# Patient Record
Sex: FEMALE | Race: WHITE | NOT HISPANIC OR LATINO | Employment: OTHER | URBAN - METROPOLITAN AREA
[De-identification: names, ages, dates, MRNs, and addresses within clinical notes are randomized per-mention and may not be internally consistent; named-entity substitution may affect disease eponyms.]

---

## 2018-02-20 ENCOUNTER — APPOINTMENT (OUTPATIENT)
Dept: GENERAL RADIOLOGY | Facility: HOSPITAL | Age: 83
End: 2018-02-20

## 2018-02-20 ENCOUNTER — HOSPITAL ENCOUNTER (INPATIENT)
Facility: HOSPITAL | Age: 83
LOS: 7 days | Discharge: NURSING, REHAB OR LONG-TERM CARE | End: 2018-02-27
Attending: EMERGENCY MEDICINE | Admitting: HOSPITALIST

## 2018-02-20 DIAGNOSIS — R26.2 DIFFICULTY WALKING: ICD-10-CM

## 2018-02-20 DIAGNOSIS — S72.001A CLOSED FRACTURE OF NECK OF RIGHT FEMUR, INITIAL ENCOUNTER (HCC): Primary | ICD-10-CM
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
## 2018-02-20 DIAGNOSIS — I48.20 CHRONIC ATRIAL FIBRILLATION (HCC): ICD-10-CM

## 2018-02-20 LAB
ALBUMIN SERPL-MCNC: 4 G/DL (ref 3.5–5.2)
ALBUMIN/GLOB SERPL: 1.5 G/DL
ALP SERPL-CCNC: 75 U/L (ref 39–117)
ALT SERPL W P-5'-P-CCNC: 15 U/L (ref 1–33)
ANION GAP SERPL CALCULATED.3IONS-SCNC: 14 MMOL/L
AST SERPL-CCNC: 20 U/L (ref 1–32)
BASOPHILS # BLD AUTO: 0.02 10*3/MM3 (ref 0–0.2)
BASOPHILS NFR BLD AUTO: 0.2 % (ref 0–1.5)
BILIRUB SERPL-MCNC: 1.1 MG/DL (ref 0.1–1.2)
BUN BLD-MCNC: 16 MG/DL (ref 8–23)
BUN/CREAT SERPL: 18.2 (ref 7–25)
CALCIUM SPEC-SCNC: 9.5 MG/DL (ref 8.6–10.5)
CHLORIDE SERPL-SCNC: 101 MMOL/L (ref 98–107)
CO2 SERPL-SCNC: 26 MMOL/L (ref 22–29)
CREAT BLD-MCNC: 0.88 MG/DL (ref 0.57–1)
DEPRECATED RDW RBC AUTO: 48.8 FL (ref 37–54)
DIGOXIN SERPL-MCNC: 1.1 NG/ML (ref 0.6–1.2)
EOSINOPHIL # BLD AUTO: 0.04 10*3/MM3 (ref 0–0.7)
EOSINOPHIL NFR BLD AUTO: 0.5 % (ref 0.3–6.2)
ERYTHROCYTE [DISTWIDTH] IN BLOOD BY AUTOMATED COUNT: 14.7 % (ref 11.7–13)
GFR SERPL CREATININE-BSD FRML MDRD: 61 ML/MIN/1.73
GLOBULIN UR ELPH-MCNC: 2.7 GM/DL
GLUCOSE BLD-MCNC: 115 MG/DL (ref 65–99)
HCT VFR BLD AUTO: 42.4 % (ref 35.6–45.5)
HGB BLD-MCNC: 13.6 G/DL (ref 11.9–15.5)
IMM GRANULOCYTES # BLD: 0.02 10*3/MM3 (ref 0–0.03)
IMM GRANULOCYTES NFR BLD: 0.2 % (ref 0–0.5)
LYMPHOCYTES # BLD AUTO: 0.72 10*3/MM3 (ref 0.9–4.8)
LYMPHOCYTES NFR BLD AUTO: 8.6 % (ref 19.6–45.3)
MCH RBC QN AUTO: 29.1 PG (ref 26.9–32)
MCHC RBC AUTO-ENTMCNC: 32.1 G/DL (ref 32.4–36.3)
MCV RBC AUTO: 90.6 FL (ref 80.5–98.2)
MONOCYTES # BLD AUTO: 0.6 10*3/MM3 (ref 0.2–1.2)
MONOCYTES NFR BLD AUTO: 7.2 % (ref 5–12)
NEUTROPHILS # BLD AUTO: 6.95 10*3/MM3 (ref 1.9–8.1)
NEUTROPHILS NFR BLD AUTO: 83.3 % (ref 42.7–76)
PLATELET # BLD AUTO: 201 10*3/MM3 (ref 140–500)
PMV BLD AUTO: 9.4 FL (ref 6–12)
POTASSIUM BLD-SCNC: 4.1 MMOL/L (ref 3.5–5.2)
PROT SERPL-MCNC: 6.7 G/DL (ref 6–8.5)
RBC # BLD AUTO: 4.68 10*6/MM3 (ref 3.9–5.2)
SODIUM BLD-SCNC: 141 MMOL/L (ref 136–145)
WBC NRBC COR # BLD: 8.35 10*3/MM3 (ref 4.5–10.7)

## 2018-02-20 PROCEDURE — 71045 X-RAY EXAM CHEST 1 VIEW: CPT

## 2018-02-20 PROCEDURE — 93010 ELECTROCARDIOGRAM REPORT: CPT | Performed by: INTERNAL MEDICINE

## 2018-02-20 PROCEDURE — 80053 COMPREHEN METABOLIC PANEL: CPT | Performed by: EMERGENCY MEDICINE

## 2018-02-20 PROCEDURE — 99284 EMERGENCY DEPT VISIT MOD MDM: CPT

## 2018-02-20 PROCEDURE — 25810000003 SODIUM CHLORIDE 0.9 % WITH KCL 20 MEQ 20-0.9 MEQ/L-% SOLUTION: Performed by: HOSPITALIST

## 2018-02-20 PROCEDURE — 25010000002 MORPHINE PER 10 MG: Performed by: EMERGENCY MEDICINE

## 2018-02-20 PROCEDURE — 80162 ASSAY OF DIGOXIN TOTAL: CPT | Performed by: EMERGENCY MEDICINE

## 2018-02-20 PROCEDURE — 25010000002 HYDROMORPHONE PER 4 MG: Performed by: EMERGENCY MEDICINE

## 2018-02-20 PROCEDURE — 25010000002 HYDROMORPHONE PER 4 MG: Performed by: HOSPITALIST

## 2018-02-20 PROCEDURE — 93005 ELECTROCARDIOGRAM TRACING: CPT | Performed by: EMERGENCY MEDICINE

## 2018-02-20 PROCEDURE — 25010000002 ONDANSETRON PER 1 MG: Performed by: EMERGENCY MEDICINE

## 2018-02-20 PROCEDURE — 73502 X-RAY EXAM HIP UNI 2-3 VIEWS: CPT

## 2018-02-20 PROCEDURE — 85025 COMPLETE CBC W/AUTO DIFF WBC: CPT | Performed by: EMERGENCY MEDICINE

## 2018-02-20 RX ORDER — HYDROMORPHONE HCL 110MG/55ML
0.5 PATIENT CONTROLLED ANALGESIA SYRINGE INTRAVENOUS ONCE
Status: COMPLETED | OUTPATIENT
Start: 2018-02-20 | End: 2018-02-20

## 2018-02-20 RX ORDER — MORPHINE SULFATE 2 MG/ML
2 INJECTION, SOLUTION INTRAMUSCULAR; INTRAVENOUS ONCE
Status: DISCONTINUED | OUTPATIENT
Start: 2018-02-20 | End: 2018-02-20

## 2018-02-20 RX ORDER — ATORVASTATIN CALCIUM 40 MG/1
40 TABLET, FILM COATED ORAL DAILY
COMMUNITY
End: 2018-02-27 | Stop reason: HOSPADM

## 2018-02-20 RX ORDER — ATORVASTATIN CALCIUM 10 MG/1
10 TABLET, FILM COATED ORAL DAILY
Status: DISCONTINUED | OUTPATIENT
Start: 2018-02-20 | End: 2018-02-27 | Stop reason: HOSPADM

## 2018-02-20 RX ORDER — HYDROMORPHONE HYDROCHLORIDE 1 MG/ML
0.5 INJECTION, SOLUTION INTRAMUSCULAR; INTRAVENOUS; SUBCUTANEOUS EVERY 4 HOURS PRN
Status: DISCONTINUED | OUTPATIENT
Start: 2018-02-20 | End: 2018-02-23

## 2018-02-20 RX ORDER — ONDANSETRON 2 MG/ML
4 INJECTION INTRAMUSCULAR; INTRAVENOUS ONCE
Status: COMPLETED | OUTPATIENT
Start: 2018-02-20 | End: 2018-02-20

## 2018-02-20 RX ORDER — SODIUM CHLORIDE AND POTASSIUM CHLORIDE 150; 900 MG/100ML; MG/100ML
75 INJECTION, SOLUTION INTRAVENOUS CONTINUOUS
Status: DISCONTINUED | OUTPATIENT
Start: 2018-02-20 | End: 2018-02-21

## 2018-02-20 RX ORDER — SODIUM CHLORIDE 0.9 % (FLUSH) 0.9 %
10 SYRINGE (ML) INJECTION AS NEEDED
Status: DISCONTINUED | OUTPATIENT
Start: 2018-02-20 | End: 2018-02-27 | Stop reason: HOSPADM

## 2018-02-20 RX ORDER — PANTOPRAZOLE SODIUM 40 MG/1
40 TABLET, DELAYED RELEASE ORAL
Status: DISCONTINUED | OUTPATIENT
Start: 2018-02-21 | End: 2018-02-27 | Stop reason: HOSPADM

## 2018-02-20 RX ORDER — MORPHINE SULFATE 2 MG/ML
2 INJECTION, SOLUTION INTRAMUSCULAR; INTRAVENOUS ONCE
Status: COMPLETED | OUTPATIENT
Start: 2018-02-20 | End: 2018-02-20

## 2018-02-20 RX ORDER — ATORVASTATIN CALCIUM 20 MG/1
40 TABLET, FILM COATED ORAL DAILY
Status: DISCONTINUED | OUTPATIENT
Start: 2018-02-20 | End: 2018-02-20

## 2018-02-20 RX ORDER — DIGOXIN 125 MCG
125 TABLET ORAL
Status: DISCONTINUED | OUTPATIENT
Start: 2018-02-21 | End: 2018-02-27 | Stop reason: HOSPADM

## 2018-02-20 RX ORDER — DIGOXIN 125 MCG
125 TABLET ORAL
COMMUNITY
End: 2018-04-10 | Stop reason: HOSPADM

## 2018-02-20 RX ORDER — METOPROLOL SUCCINATE 25 MG/1
12.5 TABLET, EXTENDED RELEASE ORAL DAILY
COMMUNITY
End: 2018-02-27 | Stop reason: HOSPADM

## 2018-02-20 RX ORDER — ISOSORBIDE DINITRATE 30 MG/1
30 TABLET ORAL 4 TIMES DAILY
COMMUNITY
End: 2018-02-20

## 2018-02-20 RX ORDER — METOPROLOL SUCCINATE 25 MG/1
12.5 TABLET, EXTENDED RELEASE ORAL DAILY
Status: DISCONTINUED | OUTPATIENT
Start: 2018-02-20 | End: 2018-02-22

## 2018-02-20 RX ORDER — ASPIRIN 81 MG/1
81 TABLET, CHEWABLE ORAL DAILY
Status: DISCONTINUED | OUTPATIENT
Start: 2018-02-20 | End: 2018-02-21

## 2018-02-20 RX ORDER — ONDANSETRON 2 MG/ML
4 INJECTION INTRAMUSCULAR; INTRAVENOUS EVERY 6 HOURS PRN
Status: DISCONTINUED | OUTPATIENT
Start: 2018-02-20 | End: 2018-02-22 | Stop reason: SDUPTHER

## 2018-02-20 RX ORDER — ISOSORBIDE MONONITRATE 30 MG/1
30 TABLET, EXTENDED RELEASE ORAL DAILY
COMMUNITY
End: 2019-03-04 | Stop reason: HOSPADM

## 2018-02-20 RX ORDER — ISOSORBIDE MONONITRATE 30 MG/1
30 TABLET, EXTENDED RELEASE ORAL DAILY
Status: DISCONTINUED | OUTPATIENT
Start: 2018-02-20 | End: 2018-02-27 | Stop reason: HOSPADM

## 2018-02-20 RX ADMIN — ISOSORBIDE MONONITRATE 30 MG: 30 TABLET ORAL at 20:25

## 2018-02-20 RX ADMIN — ONDANSETRON 4 MG: 2 INJECTION INTRAMUSCULAR; INTRAVENOUS at 14:03

## 2018-02-20 RX ADMIN — MORPHINE SULFATE 2 MG: 2 INJECTION, SOLUTION INTRAMUSCULAR; INTRAVENOUS at 14:05

## 2018-02-20 RX ADMIN — METOPROLOL SUCCINATE 12.5 MG: 25 TABLET, FILM COATED, EXTENDED RELEASE ORAL at 20:25

## 2018-02-20 RX ADMIN — HYDROMORPHONE HYDROCHLORIDE 0.5 MG: 10 INJECTION INTRAMUSCULAR; INTRAVENOUS; SUBCUTANEOUS at 20:04

## 2018-02-20 RX ADMIN — POTASSIUM CHLORIDE AND SODIUM CHLORIDE 75 ML/HR: 900; 150 INJECTION, SOLUTION INTRAVENOUS at 20:02

## 2018-02-20 RX ADMIN — HYDROMORPHONE HYDROCHLORIDE 0.5 MG: 2 INJECTION INTRAMUSCULAR; INTRAVENOUS; SUBCUTANEOUS at 15:12

## 2018-02-20 RX ADMIN — ATORVASTATIN CALCIUM 10 MG: 10 TABLET, FILM COATED ORAL at 20:25

## 2018-02-20 RX ADMIN — MORPHINE SULFATE 2 MG: 2 INJECTION, SOLUTION INTRAMUSCULAR; INTRAVENOUS at 14:41

## 2018-02-20 RX ADMIN — Medication 10 ML: at 14:41

## 2018-02-21 ENCOUNTER — ANESTHESIA (OUTPATIENT)
Dept: PERIOP | Facility: HOSPITAL | Age: 83
End: 2018-02-21

## 2018-02-21 ENCOUNTER — APPOINTMENT (OUTPATIENT)
Dept: CARDIOLOGY | Facility: HOSPITAL | Age: 83
End: 2018-02-21

## 2018-02-21 ENCOUNTER — ANESTHESIA EVENT (OUTPATIENT)
Dept: PERIOP | Facility: HOSPITAL | Age: 83
End: 2018-02-21

## 2018-02-21 ENCOUNTER — APPOINTMENT (OUTPATIENT)
Dept: GENERAL RADIOLOGY | Facility: HOSPITAL | Age: 83
End: 2018-02-21

## 2018-02-21 LAB
ALBUMIN SERPL-MCNC: 3.7 G/DL (ref 3.5–5.2)
ALBUMIN/GLOB SERPL: 1.2 G/DL
ALP SERPL-CCNC: 64 U/L (ref 39–117)
ALT SERPL W P-5'-P-CCNC: 10 U/L (ref 1–33)
ANION GAP SERPL CALCULATED.3IONS-SCNC: 12.3 MMOL/L
AORTIC ARCH: 1.9 CM
AORTIC DIMENSIONLESS INDEX: 0.4 (DI)
ASCENDING AORTA: 2.6 CM
AST SERPL-CCNC: 18 U/L (ref 1–32)
BASOPHILS # BLD AUTO: 0.02 10*3/MM3 (ref 0–0.2)
BASOPHILS NFR BLD AUTO: 0.3 % (ref 0–1.5)
BH CV ECHO MEAS - ACS: 1.4 CM
BH CV ECHO MEAS - AI DEC SLOPE: 450.5 CM/SEC^2
BH CV ECHO MEAS - AI MAX PG: 71.5 MMHG
BH CV ECHO MEAS - AI MAX VEL: 422 CM/SEC
BH CV ECHO MEAS - AI P1/2T: 274.4 MSEC
BH CV ECHO MEAS - AO MEAN PG (FULL): 5 MMHG
BH CV ECHO MEAS - AO MEAN PG: 6 MMHG
BH CV ECHO MEAS - AO ROOT AREA (BSA CORRECTED): 2.4
BH CV ECHO MEAS - AO ROOT AREA: 9.1 CM^2
BH CV ECHO MEAS - AO ROOT DIAM: 3.4 CM
BH CV ECHO MEAS - AO V2 MAX: 174 CM/SEC
BH CV ECHO MEAS - AO V2 MEAN: 115 CM/SEC
BH CV ECHO MEAS - AO V2 VTI: 28 CM
BH CV ECHO MEAS - ASC AORTA: 2.6 CM
BH CV ECHO MEAS - AVA(I,A): 1.1 CM^2
BH CV ECHO MEAS - AVA(I,D): 1.1 CM^2
BH CV ECHO MEAS - BSA(HAYCOCK): 1.4 M^2
BH CV ECHO MEAS - BSA: 1.4 M^2
BH CV ECHO MEAS - BZI_BMI: 15.6 KILOGRAMS/M^2
BH CV ECHO MEAS - BZI_METRIC_HEIGHT: 162.6 CM
BH CV ECHO MEAS - BZI_METRIC_WEIGHT: 41.3 KG
BH CV ECHO MEAS - CONTRAST EF (2CH): 58.8 ML/M^2
BH CV ECHO MEAS - CONTRAST EF 4CH: 58.3 ML/M^2
BH CV ECHO MEAS - EDV(CUBED): 22 ML
BH CV ECHO MEAS - EDV(MOD-SP2): 34 ML
BH CV ECHO MEAS - EDV(MOD-SP4): 36 ML
BH CV ECHO MEAS - EDV(TEICH): 29.6 ML
BH CV ECHO MEAS - EF(CUBED): 63.6 %
BH CV ECHO MEAS - EF(MOD-SP2): 58.8 %
BH CV ECHO MEAS - EF(MOD-SP4): 58.3 %
BH CV ECHO MEAS - EF(TEICH): 56.9 %
BH CV ECHO MEAS - ESV(CUBED): 8 ML
BH CV ECHO MEAS - ESV(MOD-SP2): 14 ML
BH CV ECHO MEAS - ESV(MOD-SP4): 15 ML
BH CV ECHO MEAS - ESV(TEICH): 12.7 ML
BH CV ECHO MEAS - FS: 28.6 %
BH CV ECHO MEAS - IVS/LVPW: 1.3
BH CV ECHO MEAS - IVSD: 1.2 CM
BH CV ECHO MEAS - LV DIASTOLIC VOL/BSA (35-75): 25.7 ML/M^2
BH CV ECHO MEAS - LV MASS(C)D: 80.2 GRAMS
BH CV ECHO MEAS - LV MASS(C)DI: 57.3 GRAMS/M^2
BH CV ECHO MEAS - LV MEAN PG: 1 MMHG
BH CV ECHO MEAS - LV SYSTOLIC VOL/BSA (12-30): 10.7 ML/M^2
BH CV ECHO MEAS - LV V1 MAX: 72 CM/SEC
BH CV ECHO MEAS - LV V1 MEAN: 50.7 CM/SEC
BH CV ECHO MEAS - LV V1 VTI: 12.5 CM
BH CV ECHO MEAS - LVIDD: 2.8 CM
BH CV ECHO MEAS - LVIDS: 2 CM
BH CV ECHO MEAS - LVLD AP2: 5.2 CM
BH CV ECHO MEAS - LVLD AP4: 5.3 CM
BH CV ECHO MEAS - LVLS AP2: 4.4 CM
BH CV ECHO MEAS - LVLS AP4: 4.6 CM
BH CV ECHO MEAS - LVOT AREA (M): 2.5 CM^2
BH CV ECHO MEAS - LVOT AREA: 2.5 CM^2
BH CV ECHO MEAS - LVOT DIAM: 1.8 CM
BH CV ECHO MEAS - LVPWD: 0.9 CM
BH CV ECHO MEAS - MR MAX PG: 109.4 MMHG
BH CV ECHO MEAS - MR MAX VEL: 523 CM/SEC
BH CV ECHO MEAS - MV DEC SLOPE: 1359 CM/SEC^2
BH CV ECHO MEAS - MV DEC TIME: 0.16 SEC
BH CV ECHO MEAS - MV E MAX VEL: 143 CM/SEC
BH CV ECHO MEAS - MV MEAN PG: 4 MMHG
BH CV ECHO MEAS - MV P1/2T MAX VEL: 139 CM/SEC
BH CV ECHO MEAS - MV P1/2T: 30 MSEC
BH CV ECHO MEAS - MV V2 MEAN: 85.6 CM/SEC
BH CV ECHO MEAS - MV V2 VTI: 15.2 CM
BH CV ECHO MEAS - MVA P1/2T LCG: 1.6 CM^2
BH CV ECHO MEAS - MVA(P1/2T): 7.3 CM^2
BH CV ECHO MEAS - MVA(VTI): 2.1 CM^2
BH CV ECHO MEAS - PA ACC SLOPE: 20.2 CM/SEC^2
BH CV ECHO MEAS - PA ACC TIME: 0.06 SEC
BH CV ECHO MEAS - PA MAX PG (FULL): -0.08 MMHG
BH CV ECHO MEAS - PA MAX PG: 2 MMHG
BH CV ECHO MEAS - PA PR(ACCEL): 52 MMHG
BH CV ECHO MEAS - PA V2 MAX: 70 CM/SEC
BH CV ECHO MEAS - PULM DIAS VEL: 54.8 CM/SEC
BH CV ECHO MEAS - PULM S/D: 0.64
BH CV ECHO MEAS - PULM SYS VEL: 35.1 CM/SEC
BH CV ECHO MEAS - PVA(V,A): 2.9 CM^2
BH CV ECHO MEAS - PVA(V,D): 2.9 CM^2
BH CV ECHO MEAS - QP/QS: 0.78
BH CV ECHO MEAS - RAP SYSTOLE: 8 MMHG
BH CV ECHO MEAS - RV MAX PG: 2 MMHG
BH CV ECHO MEAS - RV MEAN PG: 1 MMHG
BH CV ECHO MEAS - RV V1 MAX: 71.4 CM/SEC
BH CV ECHO MEAS - RV V1 MEAN: 48.6 CM/SEC
BH CV ECHO MEAS - RV V1 VTI: 8.7 CM
BH CV ECHO MEAS - RVOT AREA: 2.8 CM^2
BH CV ECHO MEAS - RVOT DIAM: 1.9 CM
BH CV ECHO MEAS - RVSP: 52 MMHG
BH CV ECHO MEAS - SI(AO): 181.6 ML/M^2
BH CV ECHO MEAS - SI(CUBED): 10 ML/M^2
BH CV ECHO MEAS - SI(LVOT): 22.7 ML/M^2
BH CV ECHO MEAS - SI(MOD-SP2): 14.3 ML/M^2
BH CV ECHO MEAS - SI(MOD-SP4): 15 ML/M^2
BH CV ECHO MEAS - SI(TEICH): 12 ML/M^2
BH CV ECHO MEAS - SV(AO): 254.2 ML
BH CV ECHO MEAS - SV(CUBED): 14 ML
BH CV ECHO MEAS - SV(LVOT): 31.8 ML
BH CV ECHO MEAS - SV(MOD-SP2): 20 ML
BH CV ECHO MEAS - SV(MOD-SP4): 21 ML
BH CV ECHO MEAS - SV(RVOT): 24.7 ML
BH CV ECHO MEAS - SV(TEICH): 16.8 ML
BH CV ECHO MEAS - TAPSE (>1.6): 1.4 CM2
BH CV ECHO MEAS - TR MAX VEL: 333 CM/SEC
BH CV VAS BP RIGHT ARM: NORMAL MMHG
BH CV XLRA - RV BASE: 3.1 CM
BH CV XLRA - TDI S': 6.53 CM/SEC
BILIRUB SERPL-MCNC: 1.1 MG/DL (ref 0.1–1.2)
BUN BLD-MCNC: 20 MG/DL (ref 8–23)
BUN/CREAT SERPL: 27.8 (ref 7–25)
CALCIUM SPEC-SCNC: 9.3 MG/DL (ref 8.6–10.5)
CHLORIDE SERPL-SCNC: 103 MMOL/L (ref 98–107)
CHOLEST SERPL-MCNC: 145 MG/DL (ref 0–200)
CO2 SERPL-SCNC: 24.7 MMOL/L (ref 22–29)
CREAT BLD-MCNC: 0.72 MG/DL (ref 0.57–1)
DEPRECATED RDW RBC AUTO: 49.6 FL (ref 37–54)
DIGOXIN SERPL-MCNC: 1 NG/ML (ref 0.6–1.2)
EOSINOPHIL # BLD AUTO: 0 10*3/MM3 (ref 0–0.7)
EOSINOPHIL NFR BLD AUTO: 0 % (ref 0.3–6.2)
ERYTHROCYTE [DISTWIDTH] IN BLOOD BY AUTOMATED COUNT: 14.7 % (ref 11.7–13)
GFR SERPL CREATININE-BSD FRML MDRD: 76 ML/MIN/1.73
GLOBULIN UR ELPH-MCNC: 3.1 GM/DL
GLUCOSE BLD-MCNC: 117 MG/DL (ref 65–99)
HBA1C MFR BLD: 5.3 % (ref 4.8–5.6)
HCT VFR BLD AUTO: 42.3 % (ref 35.6–45.5)
HDLC SERPL-MCNC: 65 MG/DL (ref 40–60)
HGB BLD-MCNC: 13.1 G/DL (ref 11.9–15.5)
IMM GRANULOCYTES # BLD: 0 10*3/MM3 (ref 0–0.03)
IMM GRANULOCYTES NFR BLD: 0 % (ref 0–0.5)
LDLC SERPL CALC-MCNC: 71 MG/DL (ref 0–100)
LDLC/HDLC SERPL: 1.09 {RATIO}
LEFT ATRIUM VOLUME INDEX: 38 ML/M2
LYMPHOCYTES # BLD AUTO: 0.65 10*3/MM3 (ref 0.9–4.8)
LYMPHOCYTES NFR BLD AUTO: 8.5 % (ref 19.6–45.3)
MAXIMAL PREDICTED HEART RATE: 132 BPM
MCH RBC QN AUTO: 28.4 PG (ref 26.9–32)
MCHC RBC AUTO-ENTMCNC: 31 G/DL (ref 32.4–36.3)
MCV RBC AUTO: 91.6 FL (ref 80.5–98.2)
MONOCYTES # BLD AUTO: 0.95 10*3/MM3 (ref 0.2–1.2)
MONOCYTES NFR BLD AUTO: 12.4 % (ref 5–12)
NEUTROPHILS # BLD AUTO: 6.02 10*3/MM3 (ref 1.9–8.1)
NEUTROPHILS NFR BLD AUTO: 78.8 % (ref 42.7–76)
NT-PROBNP SERPL-MCNC: 6531 PG/ML (ref 0–1800)
PLATELET # BLD AUTO: 207 10*3/MM3 (ref 140–500)
PMV BLD AUTO: 9.7 FL (ref 6–12)
POTASSIUM BLD-SCNC: 5 MMOL/L (ref 3.5–5.2)
PROT SERPL-MCNC: 6.8 G/DL (ref 6–8.5)
RBC # BLD AUTO: 4.62 10*6/MM3 (ref 3.9–5.2)
SODIUM BLD-SCNC: 140 MMOL/L (ref 136–145)
STRESS TARGET HR: 112 BPM
TRIGL SERPL-MCNC: 47 MG/DL (ref 0–150)
TROPONIN T SERPL-MCNC: 0.03 NG/ML (ref 0–0.03)
TSH SERPL DL<=0.05 MIU/L-ACNC: 2.8 MIU/ML (ref 0.27–4.2)
VLDLC SERPL-MCNC: 9.4 MG/DL (ref 5–40)
WBC NRBC COR # BLD: 7.64 10*3/MM3 (ref 4.5–10.7)

## 2018-02-21 PROCEDURE — C1713 ANCHOR/SCREW BN/BN,TIS/BN: HCPCS | Performed by: ORTHOPAEDIC SURGERY

## 2018-02-21 PROCEDURE — 25010000002 PHENYLEPHRINE PER 1 ML: Performed by: ANESTHESIOLOGY

## 2018-02-21 PROCEDURE — 83880 ASSAY OF NATRIURETIC PEPTIDE: CPT | Performed by: HOSPITALIST

## 2018-02-21 PROCEDURE — C1776 JOINT DEVICE (IMPLANTABLE): HCPCS | Performed by: ORTHOPAEDIC SURGERY

## 2018-02-21 PROCEDURE — 25010000002 FENTANYL CITRATE (PF) 100 MCG/2ML SOLUTION: Performed by: ANESTHESIOLOGY

## 2018-02-21 PROCEDURE — 73501 X-RAY EXAM HIP UNI 1 VIEW: CPT

## 2018-02-21 PROCEDURE — 84443 ASSAY THYROID STIM HORMONE: CPT | Performed by: HOSPITALIST

## 2018-02-21 PROCEDURE — 80061 LIPID PANEL: CPT | Performed by: HOSPITALIST

## 2018-02-21 PROCEDURE — 25010000002 FUROSEMIDE PER 20 MG: Performed by: HOSPITALIST

## 2018-02-21 PROCEDURE — 25810000003 SODIUM CHLORIDE 0.9 % WITH KCL 20 MEQ 20-0.9 MEQ/L-% SOLUTION: Performed by: HOSPITALIST

## 2018-02-21 PROCEDURE — 25010000002 MIDAZOLAM PER 1 MG: Performed by: ANESTHESIOLOGY

## 2018-02-21 PROCEDURE — 0SRR019 REPLACEMENT OF RIGHT HIP JOINT, FEMORAL SURFACE WITH METAL SYNTHETIC SUBSTITUTE, CEMENTED, OPEN APPROACH: ICD-10-PCS | Performed by: ORTHOPAEDIC SURGERY

## 2018-02-21 PROCEDURE — 93306 TTE W/DOPPLER COMPLETE: CPT

## 2018-02-21 PROCEDURE — 80162 ASSAY OF DIGOXIN TOTAL: CPT | Performed by: HOSPITALIST

## 2018-02-21 PROCEDURE — 93306 TTE W/DOPPLER COMPLETE: CPT | Performed by: INTERNAL MEDICINE

## 2018-02-21 PROCEDURE — 25010000002 DEXAMETHASONE PER 1 MG: Performed by: NURSE ANESTHETIST, CERTIFIED REGISTERED

## 2018-02-21 PROCEDURE — 25010000002 ONDANSETRON PER 1 MG: Performed by: HOSPITALIST

## 2018-02-21 PROCEDURE — 25010000002 PROPOFOL 10 MG/ML EMULSION: Performed by: ANESTHESIOLOGY

## 2018-02-21 PROCEDURE — 85025 COMPLETE CBC W/AUTO DIFF WBC: CPT | Performed by: HOSPITALIST

## 2018-02-21 PROCEDURE — 25810000003 SODIUM CHLORIDE 0.9 % WITH KCL 20 MEQ 20-0.9 MEQ/L-% SOLUTION: Performed by: ORTHOPAEDIC SURGERY

## 2018-02-21 PROCEDURE — 84484 ASSAY OF TROPONIN QUANT: CPT | Performed by: HOSPITALIST

## 2018-02-21 PROCEDURE — 83036 HEMOGLOBIN GLYCOSYLATED A1C: CPT | Performed by: HOSPITALIST

## 2018-02-21 PROCEDURE — 80053 COMPREHEN METABOLIC PANEL: CPT | Performed by: HOSPITALIST

## 2018-02-21 PROCEDURE — 99221 1ST HOSP IP/OBS SF/LOW 40: CPT | Performed by: INTERNAL MEDICINE

## 2018-02-21 DEVICE — IMPLANTABLE DEVICE: Type: IMPLANTABLE DEVICE | Status: FUNCTIONAL

## 2018-02-21 DEVICE — STEM FEM VERSYS/HERITAGE PRI STD 11X120MM: Type: IMPLANTABLE DEVICE | Status: FUNCTIONAL

## 2018-02-21 DEVICE — CAP PRT HIP UNIPOLAR FX: Type: IMPLANTABLE DEVICE | Status: FUNCTIONAL

## 2018-02-21 DEVICE — CENTRLZR VERSYS DIST 9MM: Type: IMPLANTABLE DEVICE | Status: FUNCTIONAL

## 2018-02-21 RX ORDER — DEXAMETHASONE SODIUM PHOSPHATE 10 MG/ML
INJECTION INTRAMUSCULAR; INTRAVENOUS AS NEEDED
Status: DISCONTINUED | OUTPATIENT
Start: 2018-02-21 | End: 2018-02-21 | Stop reason: SURG

## 2018-02-21 RX ORDER — ACETAMINOPHEN 325 MG/1
325 TABLET ORAL EVERY 4 HOURS PRN
Status: DISCONTINUED | OUTPATIENT
Start: 2018-02-21 | End: 2018-02-27

## 2018-02-21 RX ORDER — FENTANYL CITRATE 50 UG/ML
25 INJECTION, SOLUTION INTRAMUSCULAR; INTRAVENOUS
Status: DISCONTINUED | OUTPATIENT
Start: 2018-02-21 | End: 2018-02-21 | Stop reason: HOSPADM

## 2018-02-21 RX ORDER — ONDANSETRON 4 MG/1
4 TABLET, ORALLY DISINTEGRATING ORAL EVERY 6 HOURS PRN
Status: DISCONTINUED | OUTPATIENT
Start: 2018-02-21 | End: 2018-02-24

## 2018-02-21 RX ORDER — EPHEDRINE SULFATE 50 MG/ML
5 INJECTION, SOLUTION INTRAVENOUS ONCE AS NEEDED
Status: DISCONTINUED | OUTPATIENT
Start: 2018-02-21 | End: 2018-02-21 | Stop reason: HOSPADM

## 2018-02-21 RX ORDER — SENNA AND DOCUSATE SODIUM 50; 8.6 MG/1; MG/1
1 TABLET, FILM COATED ORAL 2 TIMES DAILY
Status: DISCONTINUED | OUTPATIENT
Start: 2018-02-21 | End: 2018-02-27 | Stop reason: HOSPADM

## 2018-02-21 RX ORDER — FENTANYL CITRATE 50 UG/ML
INJECTION, SOLUTION INTRAMUSCULAR; INTRAVENOUS AS NEEDED
Status: DISCONTINUED | OUTPATIENT
Start: 2018-02-21 | End: 2018-02-21 | Stop reason: SURG

## 2018-02-21 RX ORDER — HYDRALAZINE HYDROCHLORIDE 20 MG/ML
5 INJECTION INTRAMUSCULAR; INTRAVENOUS
Status: DISCONTINUED | OUTPATIENT
Start: 2018-02-21 | End: 2018-02-21 | Stop reason: HOSPADM

## 2018-02-21 RX ORDER — FUROSEMIDE 10 MG/ML
20 INJECTION INTRAMUSCULAR; INTRAVENOUS ONCE
Status: DISCONTINUED | OUTPATIENT
Start: 2018-02-21 | End: 2018-02-24

## 2018-02-21 RX ORDER — HYDROMORPHONE HCL 110MG/55ML
0.2 PATIENT CONTROLLED ANALGESIA SYRINGE INTRAVENOUS
Status: DISCONTINUED | OUTPATIENT
Start: 2018-02-21 | End: 2018-02-21 | Stop reason: HOSPADM

## 2018-02-21 RX ORDER — LIDOCAINE HYDROCHLORIDE 20 MG/ML
INJECTION, SOLUTION INFILTRATION; PERINEURAL AS NEEDED
Status: DISCONTINUED | OUTPATIENT
Start: 2018-02-21 | End: 2018-02-21 | Stop reason: SURG

## 2018-02-21 RX ORDER — SODIUM CHLORIDE AND POTASSIUM CHLORIDE 150; 900 MG/100ML; MG/100ML
100 INJECTION, SOLUTION INTRAVENOUS CONTINUOUS
Status: DISCONTINUED | OUTPATIENT
Start: 2018-02-21 | End: 2018-02-22

## 2018-02-21 RX ORDER — OXYCODONE AND ACETAMINOPHEN 7.5; 325 MG/1; MG/1
1 TABLET ORAL ONCE AS NEEDED
Status: DISCONTINUED | OUTPATIENT
Start: 2018-02-21 | End: 2018-02-21 | Stop reason: HOSPADM

## 2018-02-21 RX ORDER — ONDANSETRON 2 MG/ML
4 INJECTION INTRAMUSCULAR; INTRAVENOUS ONCE AS NEEDED
Status: DISCONTINUED | OUTPATIENT
Start: 2018-02-21 | End: 2018-02-21 | Stop reason: HOSPADM

## 2018-02-21 RX ORDER — FAMOTIDINE 40 MG/1
40 TABLET, FILM COATED ORAL DAILY
Status: DISCONTINUED | OUTPATIENT
Start: 2018-02-21 | End: 2018-02-23

## 2018-02-21 RX ORDER — CLINDAMYCIN PHOSPHATE 600 MG/50ML
600 INJECTION INTRAVENOUS EVERY 8 HOURS
Status: COMPLETED | OUTPATIENT
Start: 2018-02-21 | End: 2018-02-22

## 2018-02-21 RX ORDER — FENTANYL CITRATE 50 UG/ML
50 INJECTION, SOLUTION INTRAMUSCULAR; INTRAVENOUS
Status: DISCONTINUED | OUTPATIENT
Start: 2018-02-21 | End: 2018-02-21 | Stop reason: HOSPADM

## 2018-02-21 RX ORDER — HYDROCODONE BITARTRATE AND ACETAMINOPHEN 7.5; 325 MG/1; MG/1
1 TABLET ORAL ONCE AS NEEDED
Status: DISCONTINUED | OUTPATIENT
Start: 2018-02-21 | End: 2018-02-21 | Stop reason: HOSPADM

## 2018-02-21 RX ORDER — HYDROMORPHONE HYDROCHLORIDE 1 MG/ML
0.25 INJECTION, SOLUTION INTRAMUSCULAR; INTRAVENOUS; SUBCUTANEOUS
Status: DISCONTINUED | OUTPATIENT
Start: 2018-02-21 | End: 2018-02-23

## 2018-02-21 RX ORDER — ONDANSETRON 2 MG/ML
4 INJECTION INTRAMUSCULAR; INTRAVENOUS EVERY 6 HOURS PRN
Status: DISCONTINUED | OUTPATIENT
Start: 2018-02-21 | End: 2018-02-27

## 2018-02-21 RX ORDER — LIDOCAINE HYDROCHLORIDE 10 MG/ML
0.5 INJECTION, SOLUTION EPIDURAL; INFILTRATION; INTRACAUDAL; PERINEURAL ONCE AS NEEDED
Status: DISCONTINUED | OUTPATIENT
Start: 2018-02-21 | End: 2018-02-21 | Stop reason: HOSPADM

## 2018-02-21 RX ORDER — UREA 10 %
1 LOTION (ML) TOPICAL NIGHTLY PRN
Status: DISCONTINUED | OUTPATIENT
Start: 2018-02-21 | End: 2018-02-24

## 2018-02-21 RX ORDER — SODIUM CHLORIDE 0.9 % (FLUSH) 0.9 %
1-10 SYRINGE (ML) INJECTION AS NEEDED
Status: DISCONTINUED | OUTPATIENT
Start: 2018-02-21 | End: 2018-02-27

## 2018-02-21 RX ORDER — SODIUM CHLORIDE 0.9 % (FLUSH) 0.9 %
1-10 SYRINGE (ML) INJECTION AS NEEDED
Status: DISCONTINUED | OUTPATIENT
Start: 2018-02-21 | End: 2018-02-21 | Stop reason: HOSPADM

## 2018-02-21 RX ORDER — DIPHENHYDRAMINE HYDROCHLORIDE 50 MG/ML
12.5 INJECTION INTRAMUSCULAR; INTRAVENOUS
Status: DISCONTINUED | OUTPATIENT
Start: 2018-02-21 | End: 2018-02-21 | Stop reason: HOSPADM

## 2018-02-21 RX ORDER — HYDROCODONE BITARTRATE AND ACETAMINOPHEN 5; 325 MG/1; MG/1
1 TABLET ORAL EVERY 4 HOURS PRN
Status: DISCONTINUED | OUTPATIENT
Start: 2018-02-21 | End: 2018-02-23

## 2018-02-21 RX ORDER — PROPOFOL 10 MG/ML
VIAL (ML) INTRAVENOUS AS NEEDED
Status: DISCONTINUED | OUTPATIENT
Start: 2018-02-21 | End: 2018-02-21 | Stop reason: SURG

## 2018-02-21 RX ORDER — CLINDAMYCIN PHOSPHATE 600 MG/50ML
600 INJECTION INTRAVENOUS ONCE
Status: COMPLETED | OUTPATIENT
Start: 2018-02-21 | End: 2018-02-21

## 2018-02-21 RX ORDER — FAMOTIDINE 10 MG/ML
20 INJECTION, SOLUTION INTRAVENOUS ONCE
Status: COMPLETED | OUTPATIENT
Start: 2018-02-21 | End: 2018-02-21

## 2018-02-21 RX ORDER — LABETALOL HYDROCHLORIDE 5 MG/ML
5 INJECTION, SOLUTION INTRAVENOUS
Status: DISCONTINUED | OUTPATIENT
Start: 2018-02-21 | End: 2018-02-21 | Stop reason: HOSPADM

## 2018-02-21 RX ORDER — AMLODIPINE BESYLATE 5 MG/1
5 TABLET ORAL
Status: DISCONTINUED | OUTPATIENT
Start: 2018-02-21 | End: 2018-02-27 | Stop reason: HOSPADM

## 2018-02-21 RX ORDER — PROMETHAZINE HYDROCHLORIDE 12.5 MG/1
12.5 TABLET ORAL EVERY 6 HOURS PRN
Status: DISCONTINUED | OUTPATIENT
Start: 2018-02-21 | End: 2018-02-24

## 2018-02-21 RX ORDER — FLUMAZENIL 0.1 MG/ML
0.2 INJECTION INTRAVENOUS AS NEEDED
Status: DISCONTINUED | OUTPATIENT
Start: 2018-02-21 | End: 2018-02-21 | Stop reason: HOSPADM

## 2018-02-21 RX ORDER — METOPROLOL TARTRATE 5 MG/5ML
INJECTION INTRAVENOUS
Status: DISPENSED
Start: 2018-02-21 | End: 2018-02-22

## 2018-02-21 RX ORDER — SODIUM CHLORIDE, SODIUM LACTATE, POTASSIUM CHLORIDE, CALCIUM CHLORIDE 600; 310; 30; 20 MG/100ML; MG/100ML; MG/100ML; MG/100ML
9 INJECTION, SOLUTION INTRAVENOUS CONTINUOUS
Status: DISCONTINUED | OUTPATIENT
Start: 2018-02-21 | End: 2018-02-21

## 2018-02-21 RX ORDER — NALOXONE HCL 0.4 MG/ML
0.1 VIAL (ML) INJECTION
Status: DISCONTINUED | OUTPATIENT
Start: 2018-02-21 | End: 2018-02-24

## 2018-02-21 RX ORDER — MIDAZOLAM HYDROCHLORIDE 1 MG/ML
1 INJECTION INTRAMUSCULAR; INTRAVENOUS
Status: DISCONTINUED | OUTPATIENT
Start: 2018-02-21 | End: 2018-02-21 | Stop reason: HOSPADM

## 2018-02-21 RX ORDER — BISACODYL 10 MG
10 SUPPOSITORY, RECTAL RECTAL DAILY PRN
Status: DISCONTINUED | OUTPATIENT
Start: 2018-02-21 | End: 2018-02-27

## 2018-02-21 RX ORDER — ONDANSETRON 4 MG/1
4 TABLET, FILM COATED ORAL EVERY 6 HOURS PRN
Status: DISCONTINUED | OUTPATIENT
Start: 2018-02-21 | End: 2018-02-24

## 2018-02-21 RX ORDER — FUROSEMIDE 10 MG/ML
40 INJECTION INTRAMUSCULAR; INTRAVENOUS ONCE
Status: COMPLETED | OUTPATIENT
Start: 2018-02-21 | End: 2018-02-21

## 2018-02-21 RX ORDER — MIDAZOLAM HYDROCHLORIDE 1 MG/ML
2 INJECTION INTRAMUSCULAR; INTRAVENOUS
Status: DISCONTINUED | OUTPATIENT
Start: 2018-02-21 | End: 2018-02-21 | Stop reason: HOSPADM

## 2018-02-21 RX ORDER — NALOXONE HCL 0.4 MG/ML
0.2 VIAL (ML) INJECTION AS NEEDED
Status: DISCONTINUED | OUTPATIENT
Start: 2018-02-21 | End: 2018-02-21 | Stop reason: HOSPADM

## 2018-02-21 RX ADMIN — PHENYLEPHRINE HYDROCHLORIDE 100 MCG: 10 INJECTION INTRAVENOUS at 15:57

## 2018-02-21 RX ADMIN — SODIUM CHLORIDE, POTASSIUM CHLORIDE, SODIUM LACTATE AND CALCIUM CHLORIDE: 600; 310; 30; 20 INJECTION, SOLUTION INTRAVENOUS at 17:20

## 2018-02-21 RX ADMIN — FENTANYL CITRATE 25 MCG: 50 INJECTION INTRAMUSCULAR; INTRAVENOUS at 16:20

## 2018-02-21 RX ADMIN — FAMOTIDINE 20 MG: 10 INJECTION, SOLUTION INTRAVENOUS at 14:32

## 2018-02-21 RX ADMIN — CLINDAMYCIN PHOSPHATE 600 MG: 12 INJECTION, SOLUTION INTRAVENOUS at 15:54

## 2018-02-21 RX ADMIN — HYDROMORPHONE HYDROCHLORIDE 0.5 MG: 10 INJECTION INTRAMUSCULAR; INTRAVENOUS; SUBCUTANEOUS at 04:19

## 2018-02-21 RX ADMIN — PHENYLEPHRINE HYDROCHLORIDE 100 MCG: 10 INJECTION INTRAVENOUS at 16:01

## 2018-02-21 RX ADMIN — PANTOPRAZOLE SODIUM 40 MG: 40 TABLET, DELAYED RELEASE ORAL at 04:19

## 2018-02-21 RX ADMIN — METOPROLOL TARTRATE 2.5 MG: 5 INJECTION INTRAVENOUS at 14:53

## 2018-02-21 RX ADMIN — POTASSIUM CHLORIDE AND SODIUM CHLORIDE 75 ML/HR: 900; 150 INJECTION, SOLUTION INTRAVENOUS at 09:15

## 2018-02-21 RX ADMIN — PROPOFOL 100 MG: 10 INJECTION, EMULSION INTRAVENOUS at 15:48

## 2018-02-21 RX ADMIN — DEXAMETHASONE SODIUM PHOSPHATE 6 MG: 10 INJECTION INTRAMUSCULAR; INTRAVENOUS at 16:33

## 2018-02-21 RX ADMIN — FUROSEMIDE 20 MG: 10 INJECTION, SOLUTION INTRAMUSCULAR; INTRAVENOUS at 13:23

## 2018-02-21 RX ADMIN — FENTANYL CITRATE 25 MCG: 50 INJECTION INTRAMUSCULAR; INTRAVENOUS at 16:29

## 2018-02-21 RX ADMIN — ATORVASTATIN CALCIUM 10 MG: 10 TABLET, FILM COATED ORAL at 09:01

## 2018-02-21 RX ADMIN — ISOSORBIDE MONONITRATE 30 MG: 30 TABLET ORAL at 09:01

## 2018-02-21 RX ADMIN — HYDROMORPHONE HYDROCHLORIDE 0.5 MG: 10 INJECTION INTRAMUSCULAR; INTRAVENOUS; SUBCUTANEOUS at 09:45

## 2018-02-21 RX ADMIN — SODIUM CHLORIDE, POTASSIUM CHLORIDE, SODIUM LACTATE AND CALCIUM CHLORIDE 9 ML/HR: 600; 310; 30; 20 INJECTION, SOLUTION INTRAVENOUS at 14:32

## 2018-02-21 RX ADMIN — ONDANSETRON 4 MG: 2 INJECTION INTRAMUSCULAR; INTRAVENOUS at 17:05

## 2018-02-21 RX ADMIN — PHENYLEPHRINE HYDROCHLORIDE 100 MCG: 10 INJECTION INTRAVENOUS at 17:09

## 2018-02-21 RX ADMIN — LIDOCAINE HYDROCHLORIDE 50 MG: 20 INJECTION, SOLUTION INFILTRATION; PERINEURAL at 15:48

## 2018-02-21 RX ADMIN — MIDAZOLAM 0.5 MG: 1 INJECTION INTRAMUSCULAR; INTRAVENOUS at 14:32

## 2018-02-21 RX ADMIN — PHENYLEPHRINE HYDROCHLORIDE 100 MCG: 10 INJECTION INTRAVENOUS at 16:15

## 2018-02-21 RX ADMIN — SODIUM CHLORIDE, POTASSIUM CHLORIDE, SODIUM LACTATE AND CALCIUM CHLORIDE: 600; 310; 30; 20 INJECTION, SOLUTION INTRAVENOUS at 15:48

## 2018-02-21 RX ADMIN — METOPROLOL TARTRATE 5 MG: 5 INJECTION, SOLUTION INTRAVENOUS at 13:40

## 2018-02-21 RX ADMIN — METOPROLOL SUCCINATE 12.5 MG: 25 TABLET, FILM COATED, EXTENDED RELEASE ORAL at 09:01

## 2018-02-21 RX ADMIN — POTASSIUM CHLORIDE AND SODIUM CHLORIDE 100 ML/HR: 900; 150 INJECTION, SOLUTION INTRAVENOUS at 20:03

## 2018-02-21 NOTE — ANESTHESIA PREPROCEDURE EVALUATION
Anesthesia Evaluation     NPO Solid Status: > 8 hours  NPO Liquid Status: > 8 hours           Airway   Mallampati: II  TM distance: >3 FB  Neck ROM: full  no difficulty expected  Dental - normal exam     Pulmonary - normal exam    breath sounds clear to auscultation  (-) decreased breath sounds, wheezes  Cardiovascular - normal exam    Rhythm: regular  Rate: normal    (+) hypertension, past MI , CAD, CABG 3-6 Months, cardiac stents Bare metal stent more than 12 months ago dysrhythmias Atrial Fib, hyperlipidemia  Valvular problems/murmurs: The EF 4ch was calculated at 60%.      Neuro/Psych  (+) TIA, CVA,     GI/Hepatic/Renal/Endo      Musculoskeletal     Abdominal  - normal exam   Substance History      OB/GYN          Other        ROS/Med Hx Other: The EF 4ch was calculated at 60%.  Mild concentric left ventricular hypertrophy.  The left atrium is severely enlarged and right atrial cavity size is moderately enlarged.  Trivial to mild aortic regurgitation.  Mild mitral regurgitation                Anesthesia Plan    ASA 4     general     intravenous induction   Anesthetic plan and risks discussed with patient.    Plan discussed with CRNA.

## 2018-02-21 NOTE — ANESTHESIA POSTPROCEDURE EVALUATION
"Patient: Lacy Valerio    Procedure Summary     Date Anesthesia Start Anesthesia Stop Room / Location    02/21/18 [id number]  [facility] / [facility]       Procedure Diagnosis Surgeon Provider    RT. HIP ENDOPROSTHESIS ANTERIOR (Right Hip) No diagnosis on file. MD Juan Multani MD          Anesthesia Type: general  Last vitals  BP   (!) 163/101 (02/21/18 1820)   Temp   37.1 °C (98.7 °F) (02/21/18 1731)   Pulse   105 (02/21/18 1820)   Resp   16 (02/21/18 1820)     SpO2   98 % (02/21/18 1820)     Post Anesthesia Care and Evaluation    Patient location during evaluation: PACU  Patient participation: complete - patient participated  Level of consciousness: awake and alert  Pain management: adequate  Airway patency: patent  Anesthetic complications: No anesthetic complications    Cardiovascular status: acceptable  Respiratory status: acceptable  Hydration status: acceptable    Comments: BP (!) 163/101  Pulse 105  Temp 37.1 °C (98.7 °F) (Oral)   Resp 16  Ht 162.6 cm (64\")  Wt 41.4 kg (91 lb 4 oz)  SpO2 98%  BMI 15.66 kg/m2      "

## 2018-02-21 NOTE — ANESTHESIA PROCEDURE NOTES
Airway  Urgency: elective    Airway not difficult    General Information and Staff    Patient location during procedure: OR  Anesthesiologist: JV NEGRON    Indications and Patient Condition  Indications for airway management: airway protection    Preoxygenated: yes  MILS maintained throughout  Mask difficulty assessment: 0 - not attempted    Final Airway Details  Final airway type: supraglottic airway      Successful airway: classic  Size 3    Number of attempts at approach: 1

## 2018-02-22 LAB
ANION GAP SERPL CALCULATED.3IONS-SCNC: 13.1 MMOL/L
BASOPHILS # BLD AUTO: 0 10*3/MM3 (ref 0–0.2)
BASOPHILS NFR BLD AUTO: 0 % (ref 0–1.5)
BUN BLD-MCNC: 27 MG/DL (ref 8–23)
BUN/CREAT SERPL: 43.5 (ref 7–25)
CALCIUM SPEC-SCNC: 8.8 MG/DL (ref 8.6–10.5)
CHLORIDE SERPL-SCNC: 102 MMOL/L (ref 98–107)
CO2 SERPL-SCNC: 23.9 MMOL/L (ref 22–29)
CREAT BLD-MCNC: 0.62 MG/DL (ref 0.57–1)
DEPRECATED RDW RBC AUTO: 49.2 FL (ref 37–54)
EOSINOPHIL # BLD AUTO: 0 10*3/MM3 (ref 0–0.7)
EOSINOPHIL NFR BLD AUTO: 0 % (ref 0.3–6.2)
ERYTHROCYTE [DISTWIDTH] IN BLOOD BY AUTOMATED COUNT: 14.8 % (ref 11.7–13)
GFR SERPL CREATININE-BSD FRML MDRD: 91 ML/MIN/1.73
GLUCOSE BLD-MCNC: 135 MG/DL (ref 65–99)
HCT VFR BLD AUTO: 36.6 % (ref 35.6–45.5)
HGB BLD-MCNC: 11.5 G/DL (ref 11.9–15.5)
IMM GRANULOCYTES # BLD: 0.02 10*3/MM3 (ref 0–0.03)
IMM GRANULOCYTES NFR BLD: 0.2 % (ref 0–0.5)
LYMPHOCYTES # BLD AUTO: 0.45 10*3/MM3 (ref 0.9–4.8)
LYMPHOCYTES NFR BLD AUTO: 4.3 % (ref 19.6–45.3)
MCH RBC QN AUTO: 28.6 PG (ref 26.9–32)
MCHC RBC AUTO-ENTMCNC: 31.4 G/DL (ref 32.4–36.3)
MCV RBC AUTO: 91 FL (ref 80.5–98.2)
MONOCYTES # BLD AUTO: 0.93 10*3/MM3 (ref 0.2–1.2)
MONOCYTES NFR BLD AUTO: 8.8 % (ref 5–12)
NEUTROPHILS # BLD AUTO: 9.18 10*3/MM3 (ref 1.9–8.1)
NEUTROPHILS NFR BLD AUTO: 86.7 % (ref 42.7–76)
NT-PROBNP SERPL-MCNC: 7247 PG/ML (ref 0–1800)
PLATELET # BLD AUTO: 203 10*3/MM3 (ref 140–500)
PMV BLD AUTO: 9.6 FL (ref 6–12)
POTASSIUM BLD-SCNC: 4.7 MMOL/L (ref 3.5–5.2)
RBC # BLD AUTO: 4.02 10*6/MM3 (ref 3.9–5.2)
SODIUM BLD-SCNC: 139 MMOL/L (ref 136–145)
TROPONIN T SERPL-MCNC: 0.02 NG/ML (ref 0–0.03)
WBC NRBC COR # BLD: 10.58 10*3/MM3 (ref 4.5–10.7)

## 2018-02-22 PROCEDURE — 84484 ASSAY OF TROPONIN QUANT: CPT | Performed by: HOSPITALIST

## 2018-02-22 PROCEDURE — 93005 ELECTROCARDIOGRAM TRACING: CPT | Performed by: NURSE PRACTITIONER

## 2018-02-22 PROCEDURE — 25010000002 LORAZEPAM PER 2 MG: Performed by: HOSPITALIST

## 2018-02-22 PROCEDURE — 80048 BASIC METABOLIC PNL TOTAL CA: CPT | Performed by: ORTHOPAEDIC SURGERY

## 2018-02-22 PROCEDURE — 93010 ELECTROCARDIOGRAM REPORT: CPT | Performed by: INTERNAL MEDICINE

## 2018-02-22 PROCEDURE — 25810000003 SODIUM CHLORIDE 0.9 % WITH KCL 20 MEQ 20-0.9 MEQ/L-% SOLUTION: Performed by: ORTHOPAEDIC SURGERY

## 2018-02-22 PROCEDURE — 99232 SBSQ HOSP IP/OBS MODERATE 35: CPT | Performed by: INTERNAL MEDICINE

## 2018-02-22 PROCEDURE — 85025 COMPLETE CBC W/AUTO DIFF WBC: CPT | Performed by: HOSPITALIST

## 2018-02-22 PROCEDURE — 97110 THERAPEUTIC EXERCISES: CPT

## 2018-02-22 PROCEDURE — 25010000002 ENOXAPARIN PER 10 MG: Performed by: ORTHOPAEDIC SURGERY

## 2018-02-22 PROCEDURE — 83880 ASSAY OF NATRIURETIC PEPTIDE: CPT | Performed by: HOSPITALIST

## 2018-02-22 PROCEDURE — 97162 PT EVAL MOD COMPLEX 30 MIN: CPT

## 2018-02-22 RX ORDER — METOPROLOL SUCCINATE 25 MG/1
12.5 TABLET, EXTENDED RELEASE ORAL DAILY
Status: DISCONTINUED | OUTPATIENT
Start: 2018-02-23 | End: 2018-02-23

## 2018-02-22 RX ORDER — LORAZEPAM 2 MG/ML
0.5 INJECTION INTRAMUSCULAR EVERY 4 HOURS PRN
Status: DISCONTINUED | OUTPATIENT
Start: 2018-02-22 | End: 2018-02-24

## 2018-02-22 RX ORDER — METOPROLOL SUCCINATE 25 MG/1
25 TABLET, EXTENDED RELEASE ORAL DAILY
Status: DISCONTINUED | OUTPATIENT
Start: 2018-02-23 | End: 2018-02-22

## 2018-02-22 RX ADMIN — ISOSORBIDE MONONITRATE 30 MG: 30 TABLET ORAL at 08:17

## 2018-02-22 RX ADMIN — METOPROLOL TARTRATE 2.5 MG: 5 INJECTION, SOLUTION INTRAVENOUS at 07:50

## 2018-02-22 RX ADMIN — ACETAMINOPHEN 325 MG: 325 TABLET, FILM COATED ORAL at 18:08

## 2018-02-22 RX ADMIN — POTASSIUM CHLORIDE AND SODIUM CHLORIDE 100 ML/HR: 900; 150 INJECTION, SOLUTION INTRAVENOUS at 04:44

## 2018-02-22 RX ADMIN — METOPROLOL SUCCINATE 12.5 MG: 25 TABLET, FILM COATED, EXTENDED RELEASE ORAL at 09:15

## 2018-02-22 RX ADMIN — DOCUSATE SODIUM -SENNOSIDES 1 TABLET: 50; 8.6 TABLET, COATED ORAL at 20:40

## 2018-02-22 RX ADMIN — HYDROCODONE BITARTRATE AND ACETAMINOPHEN 1 TABLET: 5; 325 TABLET ORAL at 13:36

## 2018-02-22 RX ADMIN — ATORVASTATIN CALCIUM 10 MG: 10 TABLET, FILM COATED ORAL at 08:17

## 2018-02-22 RX ADMIN — CLINDAMYCIN PHOSPHATE 600 MG: 12 INJECTION, SOLUTION INTRAMUSCULAR; INTRAVENOUS at 00:16

## 2018-02-22 RX ADMIN — APIXABAN 2.5 MG: 2.5 TABLET, FILM COATED ORAL at 20:40

## 2018-02-22 RX ADMIN — PANTOPRAZOLE SODIUM 40 MG: 40 TABLET, DELAYED RELEASE ORAL at 06:17

## 2018-02-22 RX ADMIN — DIGOXIN 125 MCG: 0.12 TABLET ORAL at 11:36

## 2018-02-22 RX ADMIN — LORAZEPAM 0.5 MG: 2 INJECTION INTRAMUSCULAR; INTRAVENOUS at 08:05

## 2018-02-22 RX ADMIN — ENOXAPARIN SODIUM 40 MG: 40 INJECTION SUBCUTANEOUS at 08:17

## 2018-02-22 RX ADMIN — FAMOTIDINE 40 MG: 40 TABLET, FILM COATED ORAL at 08:17

## 2018-02-22 RX ADMIN — HYDROCODONE BITARTRATE AND ACETAMINOPHEN 1 TABLET: 5; 325 TABLET ORAL at 00:16

## 2018-02-22 RX ADMIN — CLINDAMYCIN PHOSPHATE 600 MG: 12 INJECTION, SOLUTION INTRAMUSCULAR; INTRAVENOUS at 06:17

## 2018-02-22 RX ADMIN — AMLODIPINE BESYLATE 5 MG: 5 TABLET ORAL at 08:17

## 2018-02-22 RX ADMIN — DOCUSATE SODIUM -SENNOSIDES 1 TABLET: 50; 8.6 TABLET, COATED ORAL at 08:17

## 2018-02-23 LAB
ANION GAP SERPL CALCULATED.3IONS-SCNC: 7.2 MMOL/L
BASOPHILS # BLD AUTO: 0.01 10*3/MM3 (ref 0–0.2)
BASOPHILS NFR BLD AUTO: 0.1 % (ref 0–1.5)
BUN BLD-MCNC: 33 MG/DL (ref 8–23)
BUN/CREAT SERPL: 49.3 (ref 7–25)
CALCIUM SPEC-SCNC: 9.1 MG/DL (ref 8.6–10.5)
CHLORIDE SERPL-SCNC: 101 MMOL/L (ref 98–107)
CO2 SERPL-SCNC: 24.8 MMOL/L (ref 22–29)
CREAT BLD-MCNC: 0.67 MG/DL (ref 0.57–1)
DEPRECATED RDW RBC AUTO: 48.6 FL (ref 37–54)
EOSINOPHIL # BLD AUTO: 0 10*3/MM3 (ref 0–0.7)
EOSINOPHIL NFR BLD AUTO: 0 % (ref 0.3–6.2)
ERYTHROCYTE [DISTWIDTH] IN BLOOD BY AUTOMATED COUNT: 14.8 % (ref 11.7–13)
GFR SERPL CREATININE-BSD FRML MDRD: 83 ML/MIN/1.73
GLUCOSE BLD-MCNC: 123 MG/DL (ref 65–99)
HCT VFR BLD AUTO: 34.7 % (ref 35.6–45.5)
HGB BLD-MCNC: 10.9 G/DL (ref 11.9–15.5)
IMM GRANULOCYTES # BLD: 0 10*3/MM3 (ref 0–0.03)
IMM GRANULOCYTES NFR BLD: 0 % (ref 0–0.5)
LYMPHOCYTES # BLD AUTO: 0.76 10*3/MM3 (ref 0.9–4.8)
LYMPHOCYTES NFR BLD AUTO: 8 % (ref 19.6–45.3)
MCH RBC QN AUTO: 28.4 PG (ref 26.9–32)
MCHC RBC AUTO-ENTMCNC: 31.4 G/DL (ref 32.4–36.3)
MCV RBC AUTO: 90.4 FL (ref 80.5–98.2)
MONOCYTES # BLD AUTO: 1.05 10*3/MM3 (ref 0.2–1.2)
MONOCYTES NFR BLD AUTO: 11 % (ref 5–12)
NEUTROPHILS # BLD AUTO: 7.7 10*3/MM3 (ref 1.9–8.1)
NEUTROPHILS NFR BLD AUTO: 80.9 % (ref 42.7–76)
PLATELET # BLD AUTO: 222 10*3/MM3 (ref 140–500)
PMV BLD AUTO: 9.4 FL (ref 6–12)
POTASSIUM BLD-SCNC: 4.1 MMOL/L (ref 3.5–5.2)
RBC # BLD AUTO: 3.84 10*6/MM3 (ref 3.9–5.2)
SODIUM BLD-SCNC: 133 MMOL/L (ref 136–145)
WBC NRBC COR # BLD: 9.52 10*3/MM3 (ref 4.5–10.7)

## 2018-02-23 PROCEDURE — 25010000002 DIGOXIN PER 500 MCG: Performed by: NURSE PRACTITIONER

## 2018-02-23 PROCEDURE — 25010000002 LORAZEPAM PER 2 MG: Performed by: HOSPITALIST

## 2018-02-23 PROCEDURE — 80048 BASIC METABOLIC PNL TOTAL CA: CPT | Performed by: HOSPITALIST

## 2018-02-23 PROCEDURE — 93010 ELECTROCARDIOGRAM REPORT: CPT | Performed by: INTERNAL MEDICINE

## 2018-02-23 PROCEDURE — 99232 SBSQ HOSP IP/OBS MODERATE 35: CPT | Performed by: INTERNAL MEDICINE

## 2018-02-23 PROCEDURE — 85025 COMPLETE CBC W/AUTO DIFF WBC: CPT | Performed by: HOSPITALIST

## 2018-02-23 PROCEDURE — 93005 ELECTROCARDIOGRAM TRACING: CPT | Performed by: HOSPITALIST

## 2018-02-23 RX ORDER — METOPROLOL SUCCINATE 25 MG/1
25 TABLET, EXTENDED RELEASE ORAL DAILY
Status: DISCONTINUED | OUTPATIENT
Start: 2018-02-24 | End: 2018-02-24

## 2018-02-23 RX ORDER — METOPROLOL SUCCINATE 25 MG/1
12.5 TABLET, EXTENDED RELEASE ORAL ONCE
Status: COMPLETED | OUTPATIENT
Start: 2018-02-23 | End: 2018-02-23

## 2018-02-23 RX ORDER — TRAMADOL HYDROCHLORIDE 50 MG/1
50 TABLET ORAL EVERY 6 HOURS PRN
Status: DISCONTINUED | OUTPATIENT
Start: 2018-02-23 | End: 2018-02-27 | Stop reason: HOSPADM

## 2018-02-23 RX ORDER — DIGOXIN 0.25 MG/ML
125 INJECTION INTRAMUSCULAR; INTRAVENOUS ONCE
Status: COMPLETED | OUTPATIENT
Start: 2018-02-23 | End: 2018-02-23

## 2018-02-23 RX ADMIN — LORAZEPAM 0.5 MG: 2 INJECTION INTRAMUSCULAR; INTRAVENOUS at 14:39

## 2018-02-23 RX ADMIN — METOPROLOL SUCCINATE 12.5 MG: 25 TABLET, FILM COATED, EXTENDED RELEASE ORAL at 09:35

## 2018-02-23 RX ADMIN — APIXABAN 2.5 MG: 2.5 TABLET, FILM COATED ORAL at 21:30

## 2018-02-23 RX ADMIN — DIGOXIN 125 MCG: 0.25 INJECTION INTRAMUSCULAR; INTRAVENOUS at 10:35

## 2018-02-23 RX ADMIN — AMLODIPINE BESYLATE 5 MG: 5 TABLET ORAL at 08:14

## 2018-02-23 RX ADMIN — DOCUSATE SODIUM -SENNOSIDES 1 TABLET: 50; 8.6 TABLET, COATED ORAL at 21:30

## 2018-02-23 RX ADMIN — TRAMADOL HYDROCHLORIDE 50 MG: 50 TABLET, FILM COATED ORAL at 16:42

## 2018-02-23 RX ADMIN — METOPROLOL SUCCINATE 12.5 MG: 25 TABLET, FILM COATED, EXTENDED RELEASE ORAL at 08:15

## 2018-02-23 RX ADMIN — ATORVASTATIN CALCIUM 10 MG: 10 TABLET, FILM COATED ORAL at 08:14

## 2018-02-23 RX ADMIN — PANTOPRAZOLE SODIUM 40 MG: 40 TABLET, DELAYED RELEASE ORAL at 06:18

## 2018-02-23 RX ADMIN — ISOSORBIDE MONONITRATE 30 MG: 30 TABLET ORAL at 08:14

## 2018-02-23 RX ADMIN — APIXABAN 2.5 MG: 2.5 TABLET, FILM COATED ORAL at 08:14

## 2018-02-23 RX ADMIN — DIGOXIN 125 MCG: 0.12 TABLET ORAL at 12:15

## 2018-02-23 RX ADMIN — HYDROCODONE BITARTRATE AND ACETAMINOPHEN 1 TABLET: 5; 325 TABLET ORAL at 06:18

## 2018-02-23 RX ADMIN — FAMOTIDINE 40 MG: 40 TABLET, FILM COATED ORAL at 08:14

## 2018-02-23 RX ADMIN — LORAZEPAM 0.5 MG: 2 INJECTION INTRAMUSCULAR; INTRAVENOUS at 21:29

## 2018-02-23 RX ADMIN — DOCUSATE SODIUM -SENNOSIDES 1 TABLET: 50; 8.6 TABLET, COATED ORAL at 08:14

## 2018-02-24 LAB
ANION GAP SERPL CALCULATED.3IONS-SCNC: 9.7 MMOL/L
BASOPHILS # BLD AUTO: 0.01 10*3/MM3 (ref 0–0.2)
BASOPHILS NFR BLD AUTO: 0.1 % (ref 0–1.5)
BUN BLD-MCNC: 35 MG/DL (ref 8–23)
BUN/CREAT SERPL: 54.7 (ref 7–25)
CALCIUM SPEC-SCNC: 9.2 MG/DL (ref 8.6–10.5)
CHLORIDE SERPL-SCNC: 98 MMOL/L (ref 98–107)
CO2 SERPL-SCNC: 27.3 MMOL/L (ref 22–29)
CREAT BLD-MCNC: 0.64 MG/DL (ref 0.57–1)
DEPRECATED RDW RBC AUTO: 48.6 FL (ref 37–54)
EOSINOPHIL # BLD AUTO: 0.01 10*3/MM3 (ref 0–0.7)
EOSINOPHIL NFR BLD AUTO: 0.1 % (ref 0.3–6.2)
ERYTHROCYTE [DISTWIDTH] IN BLOOD BY AUTOMATED COUNT: 14.6 % (ref 11.7–13)
GFR SERPL CREATININE-BSD FRML MDRD: 88 ML/MIN/1.73
GLUCOSE BLD-MCNC: 128 MG/DL (ref 65–99)
HCT VFR BLD AUTO: 30.7 % (ref 35.6–45.5)
HGB BLD-MCNC: 9.6 G/DL (ref 11.9–15.5)
IMM GRANULOCYTES # BLD: 0.03 10*3/MM3 (ref 0–0.03)
IMM GRANULOCYTES NFR BLD: 0.4 % (ref 0–0.5)
LYMPHOCYTES # BLD AUTO: 1.11 10*3/MM3 (ref 0.9–4.8)
LYMPHOCYTES NFR BLD AUTO: 13.1 % (ref 19.6–45.3)
MCH RBC QN AUTO: 28.4 PG (ref 26.9–32)
MCHC RBC AUTO-ENTMCNC: 31.3 G/DL (ref 32.4–36.3)
MCV RBC AUTO: 90.8 FL (ref 80.5–98.2)
MONOCYTES # BLD AUTO: 0.89 10*3/MM3 (ref 0.2–1.2)
MONOCYTES NFR BLD AUTO: 10.5 % (ref 5–12)
NEUTROPHILS # BLD AUTO: 6.43 10*3/MM3 (ref 1.9–8.1)
NEUTROPHILS NFR BLD AUTO: 75.8 % (ref 42.7–76)
NRBC BLD MANUAL-RTO: 0 /100 WBC (ref 0–0)
NT-PROBNP SERPL-MCNC: 2789 PG/ML (ref 0–1800)
PLATELET # BLD AUTO: 281 10*3/MM3 (ref 140–500)
PMV BLD AUTO: 9.9 FL (ref 6–12)
POTASSIUM BLD-SCNC: 3.8 MMOL/L (ref 3.5–5.2)
RBC # BLD AUTO: 3.38 10*6/MM3 (ref 3.9–5.2)
SODIUM BLD-SCNC: 135 MMOL/L (ref 136–145)
WBC NRBC COR # BLD: 8.48 10*3/MM3 (ref 4.5–10.7)

## 2018-02-24 PROCEDURE — 80048 BASIC METABOLIC PNL TOTAL CA: CPT | Performed by: HOSPITALIST

## 2018-02-24 PROCEDURE — 83880 ASSAY OF NATRIURETIC PEPTIDE: CPT | Performed by: HOSPITALIST

## 2018-02-24 PROCEDURE — 85025 COMPLETE CBC W/AUTO DIFF WBC: CPT | Performed by: HOSPITALIST

## 2018-02-24 PROCEDURE — 99232 SBSQ HOSP IP/OBS MODERATE 35: CPT | Performed by: INTERNAL MEDICINE

## 2018-02-24 PROCEDURE — 25010000002 LORAZEPAM PER 2 MG: Performed by: HOSPITALIST

## 2018-02-24 RX ORDER — METOPROLOL SUCCINATE 50 MG/1
50 TABLET, EXTENDED RELEASE ORAL DAILY
Status: DISCONTINUED | OUTPATIENT
Start: 2018-02-25 | End: 2018-02-27 | Stop reason: HOSPADM

## 2018-02-24 RX ADMIN — ATORVASTATIN CALCIUM 10 MG: 10 TABLET, FILM COATED ORAL at 11:36

## 2018-02-24 RX ADMIN — AMLODIPINE BESYLATE 5 MG: 5 TABLET ORAL at 11:37

## 2018-02-24 RX ADMIN — APIXABAN 2.5 MG: 2.5 TABLET, FILM COATED ORAL at 11:36

## 2018-02-24 RX ADMIN — ACETAMINOPHEN 325 MG: 325 TABLET, FILM COATED ORAL at 21:14

## 2018-02-24 RX ADMIN — APIXABAN 2.5 MG: 2.5 TABLET, FILM COATED ORAL at 20:02

## 2018-02-24 RX ADMIN — LORAZEPAM 0.5 MG: 2 INJECTION INTRAMUSCULAR; INTRAVENOUS at 01:57

## 2018-02-24 RX ADMIN — PANTOPRAZOLE SODIUM 40 MG: 40 TABLET, DELAYED RELEASE ORAL at 06:02

## 2018-02-24 RX ADMIN — DOCUSATE SODIUM -SENNOSIDES 1 TABLET: 50; 8.6 TABLET, COATED ORAL at 11:36

## 2018-02-24 RX ADMIN — TRAMADOL HYDROCHLORIDE 50 MG: 50 TABLET, FILM COATED ORAL at 12:47

## 2018-02-24 RX ADMIN — METOPROLOL SUCCINATE 25 MG: 25 TABLET, FILM COATED, EXTENDED RELEASE ORAL at 11:36

## 2018-02-24 RX ADMIN — DIGOXIN 125 MCG: 0.12 TABLET ORAL at 11:36

## 2018-02-24 RX ADMIN — ISOSORBIDE MONONITRATE 30 MG: 30 TABLET ORAL at 11:36

## 2018-02-25 LAB
ANION GAP SERPL CALCULATED.3IONS-SCNC: 7.7 MMOL/L
BASOPHILS # BLD AUTO: 0.02 10*3/MM3 (ref 0–0.2)
BASOPHILS NFR BLD AUTO: 0.3 % (ref 0–1.5)
BUN BLD-MCNC: 35 MG/DL (ref 8–23)
BUN/CREAT SERPL: 68.6 (ref 7–25)
CALCIUM SPEC-SCNC: 9 MG/DL (ref 8.6–10.5)
CHLORIDE SERPL-SCNC: 100 MMOL/L (ref 98–107)
CO2 SERPL-SCNC: 27.3 MMOL/L (ref 22–29)
CREAT BLD-MCNC: 0.51 MG/DL (ref 0.57–1)
DEPRECATED RDW RBC AUTO: 48.7 FL (ref 37–54)
EOSINOPHIL # BLD AUTO: 0.04 10*3/MM3 (ref 0–0.7)
EOSINOPHIL NFR BLD AUTO: 0.6 % (ref 0.3–6.2)
ERYTHROCYTE [DISTWIDTH] IN BLOOD BY AUTOMATED COUNT: 14.7 % (ref 11.7–13)
GFR SERPL CREATININE-BSD FRML MDRD: 114 ML/MIN/1.73
GLUCOSE BLD-MCNC: 97 MG/DL (ref 65–99)
HCT VFR BLD AUTO: 26.7 % (ref 35.6–45.5)
HGB BLD-MCNC: 8.2 G/DL (ref 11.9–15.5)
IMM GRANULOCYTES # BLD: 0.03 10*3/MM3 (ref 0–0.03)
IMM GRANULOCYTES NFR BLD: 0.4 % (ref 0–0.5)
LYMPHOCYTES # BLD AUTO: 0.82 10*3/MM3 (ref 0.9–4.8)
LYMPHOCYTES NFR BLD AUTO: 11.4 % (ref 19.6–45.3)
MCH RBC QN AUTO: 27.9 PG (ref 26.9–32)
MCHC RBC AUTO-ENTMCNC: 30.7 G/DL (ref 32.4–36.3)
MCV RBC AUTO: 90.8 FL (ref 80.5–98.2)
MONOCYTES # BLD AUTO: 0.75 10*3/MM3 (ref 0.2–1.2)
MONOCYTES NFR BLD AUTO: 10.4 % (ref 5–12)
NEUTROPHILS # BLD AUTO: 5.54 10*3/MM3 (ref 1.9–8.1)
NEUTROPHILS NFR BLD AUTO: 76.9 % (ref 42.7–76)
NRBC BLD MANUAL-RTO: 0 /100 WBC (ref 0–0)
NT-PROBNP SERPL-MCNC: 2775 PG/ML (ref 0–1800)
PLATELET # BLD AUTO: 248 10*3/MM3 (ref 140–500)
PMV BLD AUTO: 9.6 FL (ref 6–12)
POTASSIUM BLD-SCNC: 3.9 MMOL/L (ref 3.5–5.2)
RBC # BLD AUTO: 2.94 10*6/MM3 (ref 3.9–5.2)
SODIUM BLD-SCNC: 135 MMOL/L (ref 136–145)
WBC NRBC COR # BLD: 7.2 10*3/MM3 (ref 4.5–10.7)

## 2018-02-25 PROCEDURE — 85025 COMPLETE CBC W/AUTO DIFF WBC: CPT | Performed by: HOSPITALIST

## 2018-02-25 PROCEDURE — 25010000002 ONDANSETRON PER 1 MG: Performed by: ORTHOPAEDIC SURGERY

## 2018-02-25 PROCEDURE — 83880 ASSAY OF NATRIURETIC PEPTIDE: CPT | Performed by: HOSPITALIST

## 2018-02-25 PROCEDURE — 97110 THERAPEUTIC EXERCISES: CPT

## 2018-02-25 PROCEDURE — 80048 BASIC METABOLIC PNL TOTAL CA: CPT | Performed by: HOSPITALIST

## 2018-02-25 PROCEDURE — 99231 SBSQ HOSP IP/OBS SF/LOW 25: CPT | Performed by: INTERNAL MEDICINE

## 2018-02-25 RX ADMIN — APIXABAN 2.5 MG: 2.5 TABLET, FILM COATED ORAL at 20:37

## 2018-02-25 RX ADMIN — DOCUSATE SODIUM -SENNOSIDES 1 TABLET: 50; 8.6 TABLET, COATED ORAL at 09:04

## 2018-02-25 RX ADMIN — ONDANSETRON 4 MG: 2 INJECTION INTRAMUSCULAR; INTRAVENOUS at 14:10

## 2018-02-25 RX ADMIN — METOPROLOL SUCCINATE 50 MG: 50 TABLET, FILM COATED, EXTENDED RELEASE ORAL at 09:04

## 2018-02-25 RX ADMIN — TRAMADOL HYDROCHLORIDE 50 MG: 50 TABLET, FILM COATED ORAL at 09:04

## 2018-02-25 RX ADMIN — DIGOXIN 125 MCG: 0.12 TABLET ORAL at 12:22

## 2018-02-25 RX ADMIN — ATORVASTATIN CALCIUM 10 MG: 10 TABLET, FILM COATED ORAL at 09:04

## 2018-02-25 RX ADMIN — APIXABAN 2.5 MG: 2.5 TABLET, FILM COATED ORAL at 09:04

## 2018-02-25 RX ADMIN — PANTOPRAZOLE SODIUM 40 MG: 40 TABLET, DELAYED RELEASE ORAL at 05:29

## 2018-02-25 RX ADMIN — ISOSORBIDE MONONITRATE 30 MG: 30 TABLET ORAL at 09:04

## 2018-02-25 RX ADMIN — DOCUSATE SODIUM -SENNOSIDES 1 TABLET: 50; 8.6 TABLET, COATED ORAL at 20:37

## 2018-02-25 RX ADMIN — AMLODIPINE BESYLATE 5 MG: 5 TABLET ORAL at 09:04

## 2018-02-26 LAB
ANION GAP SERPL CALCULATED.3IONS-SCNC: 5.8 MMOL/L
BASOPHILS # BLD AUTO: 0.01 10*3/MM3 (ref 0–0.2)
BASOPHILS NFR BLD AUTO: 0.1 % (ref 0–1.5)
BUN BLD-MCNC: 31 MG/DL (ref 8–23)
BUN/CREAT SERPL: 55.4 (ref 7–25)
CALCIUM SPEC-SCNC: 8.9 MG/DL (ref 8.6–10.5)
CHLORIDE SERPL-SCNC: 100 MMOL/L (ref 98–107)
CO2 SERPL-SCNC: 33.2 MMOL/L (ref 22–29)
CREAT BLD-MCNC: 0.56 MG/DL (ref 0.57–1)
DEPRECATED RDW RBC AUTO: 47.4 FL (ref 37–54)
EOSINOPHIL # BLD AUTO: 0.11 10*3/MM3 (ref 0–0.7)
EOSINOPHIL NFR BLD AUTO: 1.4 % (ref 0.3–6.2)
ERYTHROCYTE [DISTWIDTH] IN BLOOD BY AUTOMATED COUNT: 14.4 % (ref 11.7–13)
GFR SERPL CREATININE-BSD FRML MDRD: 102 ML/MIN/1.73
GLUCOSE BLD-MCNC: 93 MG/DL (ref 65–99)
HCT VFR BLD AUTO: 27.4 % (ref 35.6–45.5)
HGB BLD-MCNC: 8.6 G/DL (ref 11.9–15.5)
IMM GRANULOCYTES # BLD: 0.02 10*3/MM3 (ref 0–0.03)
IMM GRANULOCYTES NFR BLD: 0.3 % (ref 0–0.5)
LYMPHOCYTES # BLD AUTO: 1.03 10*3/MM3 (ref 0.9–4.8)
LYMPHOCYTES NFR BLD AUTO: 12.9 % (ref 19.6–45.3)
MCH RBC QN AUTO: 28.3 PG (ref 26.9–32)
MCHC RBC AUTO-ENTMCNC: 31.4 G/DL (ref 32.4–36.3)
MCV RBC AUTO: 90.1 FL (ref 80.5–98.2)
MONOCYTES # BLD AUTO: 0.91 10*3/MM3 (ref 0.2–1.2)
MONOCYTES NFR BLD AUTO: 11.4 % (ref 5–12)
NEUTROPHILS # BLD AUTO: 5.88 10*3/MM3 (ref 1.9–8.1)
NEUTROPHILS NFR BLD AUTO: 73.9 % (ref 42.7–76)
NT-PROBNP SERPL-MCNC: 3099 PG/ML (ref 0–1800)
PLATELET # BLD AUTO: 335 10*3/MM3 (ref 140–500)
PMV BLD AUTO: 9.4 FL (ref 6–12)
POTASSIUM BLD-SCNC: 3.8 MMOL/L (ref 3.5–5.2)
RBC # BLD AUTO: 3.04 10*6/MM3 (ref 3.9–5.2)
SODIUM BLD-SCNC: 139 MMOL/L (ref 136–145)
WBC NRBC COR # BLD: 7.96 10*3/MM3 (ref 4.5–10.7)

## 2018-02-26 PROCEDURE — 80048 BASIC METABOLIC PNL TOTAL CA: CPT | Performed by: HOSPITALIST

## 2018-02-26 PROCEDURE — 25010000002 ONDANSETRON PER 1 MG: Performed by: ORTHOPAEDIC SURGERY

## 2018-02-26 PROCEDURE — 83880 ASSAY OF NATRIURETIC PEPTIDE: CPT | Performed by: HOSPITALIST

## 2018-02-26 PROCEDURE — 85025 COMPLETE CBC W/AUTO DIFF WBC: CPT | Performed by: HOSPITALIST

## 2018-02-26 PROCEDURE — 99232 SBSQ HOSP IP/OBS MODERATE 35: CPT | Performed by: INTERNAL MEDICINE

## 2018-02-26 RX ADMIN — APIXABAN 2.5 MG: 2.5 TABLET, FILM COATED ORAL at 20:15

## 2018-02-26 RX ADMIN — ISOSORBIDE MONONITRATE 30 MG: 30 TABLET ORAL at 09:50

## 2018-02-26 RX ADMIN — ATORVASTATIN CALCIUM 10 MG: 10 TABLET, FILM COATED ORAL at 09:49

## 2018-02-26 RX ADMIN — DOCUSATE SODIUM -SENNOSIDES 1 TABLET: 50; 8.6 TABLET, COATED ORAL at 20:15

## 2018-02-26 RX ADMIN — APIXABAN 2.5 MG: 2.5 TABLET, FILM COATED ORAL at 09:50

## 2018-02-26 RX ADMIN — METOPROLOL SUCCINATE 50 MG: 50 TABLET, FILM COATED, EXTENDED RELEASE ORAL at 09:49

## 2018-02-26 RX ADMIN — DOCUSATE SODIUM -SENNOSIDES 1 TABLET: 50; 8.6 TABLET, COATED ORAL at 09:49

## 2018-02-26 RX ADMIN — TRAMADOL HYDROCHLORIDE 50 MG: 50 TABLET, FILM COATED ORAL at 09:49

## 2018-02-26 RX ADMIN — PANTOPRAZOLE SODIUM 40 MG: 40 TABLET, DELAYED RELEASE ORAL at 06:16

## 2018-02-26 RX ADMIN — AMLODIPINE BESYLATE 5 MG: 5 TABLET ORAL at 09:49

## 2018-02-26 RX ADMIN — DIGOXIN 125 MCG: 0.12 TABLET ORAL at 18:41

## 2018-02-27 VITALS
HEIGHT: 64 IN | BODY MASS INDEX: 15.58 KG/M2 | HEART RATE: 109 BPM | WEIGHT: 91.27 LBS | OXYGEN SATURATION: 96 % | DIASTOLIC BLOOD PRESSURE: 69 MMHG | TEMPERATURE: 98.6 F | SYSTOLIC BLOOD PRESSURE: 113 MMHG | RESPIRATION RATE: 18 BRPM

## 2018-02-27 LAB
ANION GAP SERPL CALCULATED.3IONS-SCNC: 11.6 MMOL/L
BUN BLD-MCNC: 29 MG/DL (ref 8–23)
BUN/CREAT SERPL: 43.9 (ref 7–25)
CALCIUM SPEC-SCNC: 9 MG/DL (ref 8.6–10.5)
CHLORIDE SERPL-SCNC: 100 MMOL/L (ref 98–107)
CO2 SERPL-SCNC: 27.4 MMOL/L (ref 22–29)
CREAT BLD-MCNC: 0.66 MG/DL (ref 0.57–1)
GFR SERPL CREATININE-BSD FRML MDRD: 85 ML/MIN/1.73
GLUCOSE BLD-MCNC: 100 MG/DL (ref 65–99)
NT-PROBNP SERPL-MCNC: 2975 PG/ML (ref 0–1800)
POTASSIUM BLD-SCNC: 4.3 MMOL/L (ref 3.5–5.2)
SODIUM BLD-SCNC: 139 MMOL/L (ref 136–145)

## 2018-02-27 PROCEDURE — 99231 SBSQ HOSP IP/OBS SF/LOW 25: CPT | Performed by: NURSE PRACTITIONER

## 2018-02-27 PROCEDURE — 80048 BASIC METABOLIC PNL TOTAL CA: CPT | Performed by: HOSPITALIST

## 2018-02-27 PROCEDURE — 83880 ASSAY OF NATRIURETIC PEPTIDE: CPT | Performed by: HOSPITALIST

## 2018-02-27 PROCEDURE — 97110 THERAPEUTIC EXERCISES: CPT

## 2018-02-27 RX ORDER — ATORVASTATIN CALCIUM 10 MG/1
10 TABLET, FILM COATED ORAL DAILY
Qty: 30 TABLET | Refills: 0 | Status: SHIPPED | OUTPATIENT
Start: 2018-02-27 | End: 2018-03-29

## 2018-02-27 RX ORDER — TRAMADOL HYDROCHLORIDE 50 MG/1
50 TABLET ORAL EVERY 6 HOURS PRN
Qty: 10 TABLET | Refills: 0 | Status: SHIPPED | OUTPATIENT
Start: 2018-02-27 | End: 2018-03-02

## 2018-02-27 RX ORDER — SENNA AND DOCUSATE SODIUM 50; 8.6 MG/1; MG/1
1 TABLET, FILM COATED ORAL 2 TIMES DAILY
Qty: 60 TABLET | Refills: 0 | Status: SHIPPED | OUTPATIENT
Start: 2018-02-27 | End: 2018-03-29

## 2018-02-27 RX ORDER — PANTOPRAZOLE SODIUM 40 MG/1
40 TABLET, DELAYED RELEASE ORAL DAILY
Qty: 30 TABLET | Refills: 0 | Status: SHIPPED | OUTPATIENT
Start: 2018-02-27 | End: 2018-03-29

## 2018-02-27 RX ORDER — METOPROLOL SUCCINATE 50 MG/1
50 TABLET, EXTENDED RELEASE ORAL DAILY
Qty: 30 TABLET | Refills: 0 | Status: SHIPPED | OUTPATIENT
Start: 2018-02-27 | End: 2018-03-29

## 2018-02-27 RX ORDER — AMLODIPINE BESYLATE 5 MG/1
5 TABLET ORAL
Qty: 30 TABLET | Refills: 0 | Status: SHIPPED | OUTPATIENT
Start: 2018-02-27 | End: 2018-03-29

## 2018-02-27 RX ADMIN — DOCUSATE SODIUM -SENNOSIDES 1 TABLET: 50; 8.6 TABLET, COATED ORAL at 07:53

## 2018-02-27 RX ADMIN — ISOSORBIDE MONONITRATE 30 MG: 30 TABLET ORAL at 07:52

## 2018-02-27 RX ADMIN — PANTOPRAZOLE SODIUM 40 MG: 40 TABLET, DELAYED RELEASE ORAL at 05:41

## 2018-02-27 RX ADMIN — DIGOXIN 125 MCG: 0.12 TABLET ORAL at 11:36

## 2018-02-27 RX ADMIN — ATORVASTATIN CALCIUM 10 MG: 10 TABLET, FILM COATED ORAL at 07:52

## 2018-02-27 RX ADMIN — METOPROLOL SUCCINATE 50 MG: 50 TABLET, FILM COATED, EXTENDED RELEASE ORAL at 07:52

## 2018-02-27 RX ADMIN — APIXABAN 2.5 MG: 2.5 TABLET, FILM COATED ORAL at 07:52

## 2018-02-27 RX ADMIN — AMLODIPINE BESYLATE 5 MG: 5 TABLET ORAL at 07:52

## 2018-04-09 ENCOUNTER — APPOINTMENT (OUTPATIENT)
Dept: GENERAL RADIOLOGY | Facility: HOSPITAL | Age: 83
End: 2018-04-09

## 2018-04-09 ENCOUNTER — HOSPITAL ENCOUNTER (OUTPATIENT)
Facility: HOSPITAL | Age: 83
Setting detail: OBSERVATION
LOS: 1 days | Discharge: HOME | End: 2018-04-10
Attending: EMERGENCY MEDICINE | Admitting: HOSPITALIST

## 2018-04-09 DIAGNOSIS — M97.8XXA PERIPROSTHETIC FRACTURE OF HIP, INITIAL ENCOUNTER: Primary | ICD-10-CM

## 2018-04-09 DIAGNOSIS — Z96.649 PERIPROSTHETIC FRACTURE OF HIP, INITIAL ENCOUNTER: Primary | ICD-10-CM

## 2018-04-09 LAB
ABO GROUP BLD: NORMAL
ALBUMIN SERPL-MCNC: 3.2 G/DL (ref 3.5–5.2)
ALBUMIN/GLOB SERPL: 1.1 G/DL
ALP SERPL-CCNC: 98 U/L (ref 39–117)
ALT SERPL W P-5'-P-CCNC: 11 U/L (ref 1–33)
ANION GAP SERPL CALCULATED.3IONS-SCNC: 9.8 MMOL/L
AST SERPL-CCNC: 19 U/L (ref 1–32)
BASOPHILS # BLD AUTO: 0.05 10*3/MM3 (ref 0–0.2)
BASOPHILS NFR BLD AUTO: 0.6 % (ref 0–1.5)
BILIRUB SERPL-MCNC: 0.6 MG/DL (ref 0.1–1.2)
BLD GP AB SCN SERPL QL: NEGATIVE
BUN BLD-MCNC: 16 MG/DL (ref 8–23)
BUN/CREAT SERPL: 22.5 (ref 7–25)
CALCIUM SPEC-SCNC: 8.7 MG/DL (ref 8.6–10.5)
CHLORIDE SERPL-SCNC: 104 MMOL/L (ref 98–107)
CO2 SERPL-SCNC: 27.2 MMOL/L (ref 22–29)
CREAT BLD-MCNC: 0.71 MG/DL (ref 0.57–1)
DEPRECATED RDW RBC AUTO: 58.3 FL (ref 37–54)
DIGOXIN SERPL-MCNC: 2.5 NG/ML (ref 0.6–1.2)
EOSINOPHIL # BLD AUTO: 0.06 10*3/MM3 (ref 0–0.7)
EOSINOPHIL NFR BLD AUTO: 0.7 % (ref 0.3–6.2)
ERYTHROCYTE [DISTWIDTH] IN BLOOD BY AUTOMATED COUNT: 17.1 % (ref 11.7–13)
GFR SERPL CREATININE-BSD FRML MDRD: 78 ML/MIN/1.73
GLOBULIN UR ELPH-MCNC: 2.8 GM/DL
GLUCOSE BLD-MCNC: 107 MG/DL (ref 65–99)
HCT VFR BLD AUTO: 38.2 % (ref 35.6–45.5)
HGB BLD-MCNC: 11.5 G/DL (ref 11.9–15.5)
IMM GRANULOCYTES # BLD: 0.03 10*3/MM3 (ref 0–0.03)
IMM GRANULOCYTES NFR BLD: 0.3 % (ref 0–0.5)
INR PPP: 1.35 (ref 0.9–1.1)
LYMPHOCYTES # BLD AUTO: 1.68 10*3/MM3 (ref 0.9–4.8)
LYMPHOCYTES NFR BLD AUTO: 19.3 % (ref 19.6–45.3)
MCH RBC QN AUTO: 27.9 PG (ref 26.9–32)
MCHC RBC AUTO-ENTMCNC: 30.1 G/DL (ref 32.4–36.3)
MCV RBC AUTO: 92.7 FL (ref 80.5–98.2)
MONOCYTES # BLD AUTO: 0.8 10*3/MM3 (ref 0.2–1.2)
MONOCYTES NFR BLD AUTO: 9.2 % (ref 5–12)
NEUTROPHILS # BLD AUTO: 6.09 10*3/MM3 (ref 1.9–8.1)
NEUTROPHILS NFR BLD AUTO: 69.9 % (ref 42.7–76)
PLATELET # BLD AUTO: 326 10*3/MM3 (ref 140–500)
PMV BLD AUTO: 9.3 FL (ref 6–12)
POTASSIUM BLD-SCNC: 4.9 MMOL/L (ref 3.5–5.2)
PROT SERPL-MCNC: 6 G/DL (ref 6–8.5)
PROTHROMBIN TIME: 16.5 SECONDS (ref 11.7–14.2)
RBC # BLD AUTO: 4.12 10*6/MM3 (ref 3.9–5.2)
RH BLD: POSITIVE
SODIUM BLD-SCNC: 141 MMOL/L (ref 136–145)
T&S EXPIRATION DATE: NORMAL
WBC NRBC COR # BLD: 8.71 10*3/MM3 (ref 4.5–10.7)

## 2018-04-09 PROCEDURE — 86901 BLOOD TYPING SEROLOGIC RH(D): CPT | Performed by: EMERGENCY MEDICINE

## 2018-04-09 PROCEDURE — 85610 PROTHROMBIN TIME: CPT | Performed by: EMERGENCY MEDICINE

## 2018-04-09 PROCEDURE — 25010000002 MORPHINE PER 10 MG: Performed by: EMERGENCY MEDICINE

## 2018-04-09 PROCEDURE — 73560 X-RAY EXAM OF KNEE 1 OR 2: CPT

## 2018-04-09 PROCEDURE — 96376 TX/PRO/DX INJ SAME DRUG ADON: CPT

## 2018-04-09 PROCEDURE — 86900 BLOOD TYPING SEROLOGIC ABO: CPT | Performed by: EMERGENCY MEDICINE

## 2018-04-09 PROCEDURE — 80053 COMPREHEN METABOLIC PANEL: CPT | Performed by: EMERGENCY MEDICINE

## 2018-04-09 PROCEDURE — 93010 ELECTROCARDIOGRAM REPORT: CPT | Performed by: INTERNAL MEDICINE

## 2018-04-09 PROCEDURE — 71045 X-RAY EXAM CHEST 1 VIEW: CPT

## 2018-04-09 PROCEDURE — G0378 HOSPITAL OBSERVATION PER HR: HCPCS

## 2018-04-09 PROCEDURE — 96374 THER/PROPH/DIAG INJ IV PUSH: CPT

## 2018-04-09 PROCEDURE — 80162 ASSAY OF DIGOXIN TOTAL: CPT | Performed by: EMERGENCY MEDICINE

## 2018-04-09 PROCEDURE — 25010000002 MORPHINE PER 10 MG: Performed by: HOSPITALIST

## 2018-04-09 PROCEDURE — 86850 RBC ANTIBODY SCREEN: CPT | Performed by: EMERGENCY MEDICINE

## 2018-04-09 PROCEDURE — 93005 ELECTROCARDIOGRAM TRACING: CPT | Performed by: EMERGENCY MEDICINE

## 2018-04-09 PROCEDURE — 73502 X-RAY EXAM HIP UNI 2-3 VIEWS: CPT

## 2018-04-09 PROCEDURE — 85025 COMPLETE CBC W/AUTO DIFF WBC: CPT | Performed by: EMERGENCY MEDICINE

## 2018-04-09 PROCEDURE — 99284 EMERGENCY DEPT VISIT MOD MDM: CPT

## 2018-04-09 RX ORDER — MORPHINE SULFATE 2 MG/ML
2 INJECTION, SOLUTION INTRAMUSCULAR; INTRAVENOUS ONCE
Status: COMPLETED | OUTPATIENT
Start: 2018-04-09 | End: 2018-04-09

## 2018-04-09 RX ORDER — AMLODIPINE BESYLATE 5 MG/1
5 TABLET ORAL DAILY
COMMUNITY
End: 2019-03-04 | Stop reason: HOSPADM

## 2018-04-09 RX ORDER — MORPHINE SULFATE 2 MG/ML
2 INJECTION, SOLUTION INTRAMUSCULAR; INTRAVENOUS
Status: DISCONTINUED | OUTPATIENT
Start: 2018-04-09 | End: 2018-04-10 | Stop reason: HOSPADM

## 2018-04-09 RX ORDER — ONDANSETRON 2 MG/ML
4 INJECTION INTRAMUSCULAR; INTRAVENOUS EVERY 6 HOURS PRN
Status: DISCONTINUED | OUTPATIENT
Start: 2018-04-09 | End: 2018-04-10 | Stop reason: HOSPADM

## 2018-04-09 RX ORDER — ATORVASTATIN CALCIUM 10 MG/1
10 TABLET, FILM COATED ORAL DAILY
COMMUNITY

## 2018-04-09 RX ORDER — PANTOPRAZOLE SODIUM 40 MG/1
40 TABLET, DELAYED RELEASE ORAL DAILY
COMMUNITY

## 2018-04-09 RX ORDER — ASPIRIN 81 MG/1
81 TABLET, CHEWABLE ORAL DAILY
Status: ON HOLD | COMMUNITY
End: 2019-02-27

## 2018-04-09 RX ORDER — FAMOTIDINE 10 MG/ML
20 INJECTION, SOLUTION INTRAVENOUS DAILY
Status: DISCONTINUED | OUTPATIENT
Start: 2018-04-10 | End: 2018-04-10 | Stop reason: HOSPADM

## 2018-04-09 RX ORDER — ACETAMINOPHEN 325 MG/1
650 TABLET ORAL EVERY 6 HOURS PRN
Status: DISCONTINUED | OUTPATIENT
Start: 2018-04-09 | End: 2018-04-10 | Stop reason: HOSPADM

## 2018-04-09 RX ORDER — METOPROLOL SUCCINATE 50 MG/1
50 TABLET, EXTENDED RELEASE ORAL DAILY
COMMUNITY
End: 2019-03-04 | Stop reason: HOSPADM

## 2018-04-09 RX ADMIN — MORPHINE SULFATE 2 MG: 2 INJECTION, SOLUTION INTRAMUSCULAR; INTRAVENOUS at 18:54

## 2018-04-09 RX ADMIN — MORPHINE SULFATE 2 MG: 2 INJECTION, SOLUTION INTRAMUSCULAR; INTRAVENOUS at 23:24

## 2018-04-09 NOTE — ED PROVIDER NOTES
" EMERGENCY DEPARTMENT ENCOUNTER    Room Number:  40/40  Date seen:  4/9/2018  Time seen: 5:18 PM  PCP: Virginia Fernández MD   Orthopedist: Dr. Tolliver  Historian: Patient, Family  History Limited By: Age      HPI:  Chief Complaint: Fall  Context: Lacy Valerio is an 88 y.o. female who is s/p left hip arthroplasty performed in the past. Pt presents to the ED c/o left hip pain s/p fall sustained earlier today in which pt slipped out of bed. Pt's left hip pain worsens with ambulation and improves with remaining still. Per son, pt did not lose consciousness s/p the fall. Pt denies head injury, abdominal pain, chest pain, dyspnea, nausea, vomiting, and neck pain. Pt reports that she is on Eliquis due to h/o A-Fib. There are no other complaints at this time.     Pain Location: Left hip  Radiation: None  Quality: \"aching\"  Intensity/Severity: Moderate   Duration: Fall occurred earlier today  Onset quality: Fall was abrupt; pain was gradual  Timing: Pain is intermittent   Progression: Pain waxes and wanes   Aggravating Factors: Ambulation   Alleviating Factors: Resting   Previous Episodes: Pt has h/o hip arthroplasty   Treatment before arrival: Unknown  Associated Symptoms: Difficulty ambulating secondary to pain of the left hip         MEDICAL RECORD REVIEW    Pt has had a left hip arthroplasty performed in the past.             PAST MEDICAL HISTORY  Active Ambulatory Problems     Diagnosis Date Noted   • Closed fracture of neck of right femur 02/20/2018     Resolved Ambulatory Problems     Diagnosis Date Noted   • No Resolved Ambulatory Problems     Past Medical History:   Diagnosis Date   • Aortic valve regurgitation    • Atrial fibrillation    • CAD (coronary artery disease)    • Compression fracture of thoracic vertebra    • Hyperlipidemia    • Hypertension    • MI (myocardial infarction)    • Stroke    • TIA (transient ischemic attack)          PAST SURGICAL HISTORY  Past Surgical History:   Procedure Laterality " Date   • CARDIAC CATHETERIZATION     • CARDIAC SURGERY     • CAROTID STENT     • CORONARY ARTERY BYPASS GRAFT     • HIP ENDOPROSTHESIS Right 2/21/2018    Procedure: RT. HIP ENDOPROSTHESIS ANTERIOR;  Surgeon: Tyler Tolliver MD;  Location: [facility] OR;  Service:    • HYSTERECTOMY     • JOINT REPLACEMENT Bilateral     hip   • PARTIAL HIP ARTHROPLASTY Left          FAMILY HISTORY  History reviewed. No pertinent family history.      SOCIAL HISTORY  Social History     Social History   • Marital status: Single     Spouse name: N/A   • Number of children: N/A   • Years of education: N/A     Occupational History   • Not on file.     Social History Main Topics   • Smoking status: Never Smoker   • Smokeless tobacco: Never Used   • Alcohol use No   • Drug use: No   • Sexual activity: Defer     Other Topics Concern   • Not on file     Social History Narrative    Pt is unsure of what medical and surgical hx she has.     Pt is unaware of why she is on most of her medications as well.          ALLERGIES  Penicillins; Procainamide hcl; Procaine; and Sulfa antibiotics        REVIEW OF SYSTEMS  Review of Systems   Unable to perform ROS: Age   Respiratory: Negative for shortness of breath.    Cardiovascular: Negative for chest pain.   Gastrointestinal: Negative for abdominal pain, nausea and vomiting.   Musculoskeletal: Negative for neck pain.        Left hip pain   Neurological: Negative for syncope and headaches.            PHYSICAL EXAM  ED Triage Vitals [04/09/18 1455]   Temp Heart Rate Resp BP SpO2   98.2 °F (36.8 °C) 74 16 112/62 97 %      Temp src Heart Rate Source Patient Position BP Location FiO2 (%)   -- -- -- -- --       Physical Exam   Constitutional: No distress.   HENT:   Head: Normocephalic.   Mouth/Throat: Mucous membranes are normal.   Eyes: EOM are normal. Pupils are equal, round, and reactive to light.   Neck: Neck supple.   Cardiovascular: Normal rate, normal heart sounds and intact distal pulses.  An irregular  rhythm present.   Pulmonary/Chest: Effort normal and breath sounds normal. No respiratory distress. She has no decreased breath sounds. She has no wheezes. She has no rhonchi. She has no rales. She exhibits no tenderness.   Abdominal: Soft. There is no tenderness. There is no rebound and no guarding.   Musculoskeletal: She exhibits tenderness (of the left hip ).   Neurological: She is alert. She has normal sensation.   Pt is pleasantly confused, but answers some questions appropriately.    Skin: Skin is warm and dry.   Psychiatric: Mood and affect normal.   Nursing note and vitals reviewed.          LAB RESULTS  Recent Results (from the past 24 hour(s))   Comprehensive Metabolic Panel    Collection Time: 04/09/18  5:33 PM   Result Value Ref Range    Glucose 107 (H) 65 - 99 mg/dL    BUN 16 8 - 23 mg/dL    Creatinine 0.71 0.57 - 1.00 mg/dL    Sodium 141 136 - 145 mmol/L    Potassium 4.9 3.5 - 5.2 mmol/L    Chloride 104 98 - 107 mmol/L    CO2 27.2 22.0 - 29.0 mmol/L    Calcium 8.7 8.6 - 10.5 mg/dL    Total Protein 6.0 6.0 - 8.5 g/dL    Albumin 3.20 (L) 3.50 - 5.20 g/dL    ALT (SGPT) 11 1 - 33 U/L    AST (SGOT) 19 1 - 32 U/L    Alkaline Phosphatase 98 39 - 117 U/L    Total Bilirubin 0.6 0.1 - 1.2 mg/dL    eGFR Non African Amer 78 >60 mL/min/1.73    Globulin 2.8 gm/dL    A/G Ratio 1.1 g/dL    BUN/Creatinine Ratio 22.5 7.0 - 25.0    Anion Gap 9.8 mmol/L   Protime-INR    Collection Time: 04/09/18  5:33 PM   Result Value Ref Range    Protime 16.5 (H) 11.7 - 14.2 Seconds    INR 1.35 (H) 0.90 - 1.10   Type & Screen    Collection Time: 04/09/18  5:33 PM   Result Value Ref Range    ABO Type O     RH type Positive     Antibody Screen Negative     T&S Expiration Date 4/12/2018 11:59:59 PM    CBC Auto Differential    Collection Time: 04/09/18  5:33 PM   Result Value Ref Range    WBC 8.71 4.50 - 10.70 10*3/mm3    RBC 4.12 3.90 - 5.20 10*6/mm3    Hemoglobin 11.5 (L) 11.9 - 15.5 g/dL    Hematocrit 38.2 35.6 - 45.5 %    MCV 92.7 80.5  - 98.2 fL    MCH 27.9 26.9 - 32.0 pg    MCHC 30.1 (L) 32.4 - 36.3 g/dL    RDW 17.1 (H) 11.7 - 13.0 %    RDW-SD 58.3 (H) 37.0 - 54.0 fl    MPV 9.3 6.0 - 12.0 fL    Platelets 326 140 - 500 10*3/mm3    Neutrophil % 69.9 42.7 - 76.0 %    Lymphocyte % 19.3 (L) 19.6 - 45.3 %    Monocyte % 9.2 5.0 - 12.0 %    Eosinophil % 0.7 0.3 - 6.2 %    Basophil % 0.6 0.0 - 1.5 %    Immature Grans % 0.3 0.0 - 0.5 %    Neutrophils, Absolute 6.09 1.90 - 8.10 10*3/mm3    Lymphocytes, Absolute 1.68 0.90 - 4.80 10*3/mm3    Monocytes, Absolute 0.80 0.20 - 1.20 10*3/mm3    Eosinophils, Absolute 0.06 0.00 - 0.70 10*3/mm3    Basophils, Absolute 0.05 0.00 - 0.20 10*3/mm3    Immature Grans, Absolute 0.03 0.00 - 0.03 10*3/mm3   Digoxin Level    Collection Time: 04/09/18  5:33 PM   Result Value Ref Range    Digoxin 2.50 (H) 0.60 - 1.20 ng/mL       Ordered the above labs and reviewed the results.        RADIOLOGY  XR Hip With or Without Pelvis 2 - 3 View Left   Final Result       Acute fracture of the proximal left femoral shaft.       An ill-defined mixed density expansile process of the proximal tibial   shaft, further evaluation/follow-up recommended as indicated.       This report was finalized on 4/9/2018 4:03 PM by Dr. Hernando Horton MD.          XR Knee 1 or 2 View Left   Final Result       Acute fracture of the proximal left femoral shaft.       An ill-defined mixed density expansile process of the proximal tibial   shaft, further evaluation/follow-up recommended as indicated.       This report was finalized on 4/9/2018 4:03 PM by Dr. Hernando Horton MD.                 Ordered the above noted radiological studies. Reviewed by me in PACS.       PROCEDURES  Procedures        EKG:           EKG time: 17:38  Rhythm/Rate: A-Fib rate 89  P waves and HI: None present  QRS, axis: Normal QRS   ST and T waves: T wave inversions in V4, V5, V6     Interpreted Contemporaneously by me, independently viewed  changed compared to prior 02/25/18  (A-Fib is more controlled today)          PROGRESS AND CONSULTS  ED Course   Comment By Time   5:46 PM  Patient has fallen out of bed and appears to have a periprosthetic fracture of left femur.  Discussed with Dr. Magaña who will admit and discussed with Dr. Mcclendon who will consult. Johann Tillman MD 04/09 3856   6:39 PM  Apparently Dr. William fixed her left hip.  Awaiting consult from him.  Dr. Tucker aware. Johann Tillman MD 04/09 0599     5:20 PM:  Informed pt and family that pt's left hip X-Ray shows an acute fracture of the proximal left femoral shaft. Need for pt's admission to the hospital for further management. Pt and family agree with plan. Decision time to admit: Now.     5:25 PM:  Placed call to [facility] for admission and the orthopedist for consult. Pt will be type and screened. Blood work, CXR, and EKG ordered for further evaluation.     5:33 PM:  Discussed case with Dr. Francisco, on-call for Dr. Ramesh, hospitalist. She will admit pt to a med/surg bed for Dr. Ramesh.     5:38 PM:  Case d/w Dr. Mcclendon, orthopedist, who will consult.     6:34 PM:  Per Dr. Mcclendon, pt's right hip arthroplasty was performed by Dr. Tolliver. Dr. William performed pt's left hip arthroplasty. Dr. Francisco was informed of this and she will consult Dr. William.         MEDICAL DECISION MAKING      MDM  Number of Diagnoses or Management Options     Amount and/or Complexity of Data Reviewed  Clinical lab tests: ordered and reviewed (INR is 1.35. )  Tests in the radiology section of CPT®: ordered and reviewed (Left hip X-Ray:   Acute fracture of the proximal left femoral shaft.     An ill-defined mixed density expansile process of the proximal tibial  shaft, further evaluation/follow-up recommended as indicated.)  Tests in the medicine section of CPT®: ordered and reviewed (EKG interpreted.   )  Decide to obtain previous medical records or to obtain history from someone other than the patient: yes  Discuss the patient with  other providers: yes (Discussed case with Dr. Francisco, on-call for Dr. Ramesh, hospitalist. )    Patient Progress  Patient progress: stable             DIAGNOSIS  Final diagnoses:   Periprosthetic fracture of hip, initial encounter         DISPOSITION  Pt admitted to med/surg.      ADMISSION    Discussed treatment plan and reason for admission with pt/family and admitting physician.  Pt/family voiced understanding of the plan for admission for further testing/treatment as needed.                 Latest Documented Vital Signs:  As of 6:36 PM  BP- 124/73 HR- 74 Temp- 98.2 °F (36.8 °C) O2 sat- 98%        --  Documentation assistance provided by rogelio Harris for Dr. Primo MD.  Information recorded by the scribe was done at my direction and has been verified and validated by me.           Mariah Harris  04/09/18 6810       Johann Tillman MD  04/09/18 5266

## 2018-04-09 NOTE — ED NOTES
Pt on blood thinner, did not  Hit head.     No obvious deformity to left leg. Cap refill < 3 seconds     Cortney Ochoa RN  04/09/18 6397

## 2018-04-09 NOTE — PROGRESS NOTES
Clinical Pharmacy Services: Medication History    Lacy Valerio is a 88 y.o. female presenting to [facility] for   Chief Complaint   Patient presents with   • Fall     pt tripped and fell getting out of bed. denies hitting hed, - loc. pt c/o left hip and left knee pain.       She  has a past medical history of Aortic valve regurgitation; Atrial fibrillation; CAD (coronary artery disease); Compression fracture of thoracic vertebra; Hyperlipidemia; Hypertension; MI (myocardial infarction); Stroke; and TIA (transient ischemic attack).    Allergies as of 04/09/2018 - Reviewed 04/09/2018   Allergen Reaction Noted   • Penicillins Unknown (See Comments) 08/04/2012   • Procainamide hcl Unknown (See Comments) 08/04/2012   • Procaine Unknown (See Comments) 08/04/2012   • Sulfa antibiotics Unknown (See Comments) 08/04/2012       Medication information was obtained from: [facility]  Pharmacy and Phone Number: [place] [phone]    Prior to Admission Medications     Prescriptions Last Dose Informant Patient Reported? Taking?    amLODIPine (NORVASC) 5 MG tablet 4/9/2018  Yes Yes    Take 5 mg by mouth Daily.    apixaban (ELIQUIS) 2.5 MG tablet tablet 4/9/2018 Medication Bottle Yes Yes    Take 2.5 mg by mouth 2 (Two) Times a Day.    aspirin 81 MG chewable tablet 4/9/2018  Yes Yes    Chew 81 mg Daily.    atorvastatin (LIPITOR) 10 MG tablet 4/9/2018  Yes Yes    Take 10 mg by mouth Daily.    digoxin (LANOXIN) 125 MCG tablet 4/9/2018 Medication Bottle Yes Yes    Take 125 mcg by mouth Daily.    isosorbide mononitrate (IMDUR) 30 MG 24 hr tablet 4/9/2018 Medication Bottle Yes Yes    Take 30 mg by mouth Daily.    metoprolol succinate XL (TOPROL-XL) 50 MG 24 hr tablet 4/9/2018  Yes Yes    Take 50 mg by mouth Daily.    pantoprazole (PROTONIX) 40 MG EC tablet 4/9/2018  Yes Yes    Take 40 mg by mouth Daily.            Medication notes: None    This medication list is complete to the best of my knowledge as of 4/9/2018    Please call  if questions.    Nissa Austin, Medication History Technician  4/9/2018 6:26 PM

## 2018-04-10 VITALS
RESPIRATION RATE: 16 BRPM | TEMPERATURE: 98.2 F | WEIGHT: 85.76 LBS | BODY MASS INDEX: 14.29 KG/M2 | DIASTOLIC BLOOD PRESSURE: 59 MMHG | OXYGEN SATURATION: 95 % | HEIGHT: 65 IN | SYSTOLIC BLOOD PRESSURE: 97 MMHG | HEART RATE: 89 BPM

## 2018-04-10 LAB
BACTERIA UR QL AUTO: ABNORMAL /HPF
BASOPHILS # BLD AUTO: 0.05 10*3/MM3 (ref 0–0.2)
BASOPHILS NFR BLD AUTO: 0.5 % (ref 0–1.5)
BILIRUB UR QL STRIP: NEGATIVE
CLARITY UR: ABNORMAL
COLOR UR: YELLOW
DEPRECATED RDW RBC AUTO: 57.7 FL (ref 37–54)
EOSINOPHIL # BLD AUTO: 0.07 10*3/MM3 (ref 0–0.7)
EOSINOPHIL NFR BLD AUTO: 0.7 % (ref 0.3–6.2)
ERYTHROCYTE [DISTWIDTH] IN BLOOD BY AUTOMATED COUNT: 17 % (ref 11.7–13)
GLUCOSE UR STRIP-MCNC: NEGATIVE MG/DL
HCT VFR BLD AUTO: 37.9 % (ref 35.6–45.5)
HGB BLD-MCNC: 11.3 G/DL (ref 11.9–15.5)
HGB UR QL STRIP.AUTO: ABNORMAL
HYALINE CASTS UR QL AUTO: ABNORMAL /LPF
IMM GRANULOCYTES # BLD: 0.03 10*3/MM3 (ref 0–0.03)
IMM GRANULOCYTES NFR BLD: 0.3 % (ref 0–0.5)
KETONES UR QL STRIP: NEGATIVE
LEUKOCYTE ESTERASE UR QL STRIP.AUTO: ABNORMAL
LYMPHOCYTES # BLD AUTO: 1.68 10*3/MM3 (ref 0.9–4.8)
LYMPHOCYTES NFR BLD AUTO: 17.5 % (ref 19.6–45.3)
MCH RBC QN AUTO: 27.7 PG (ref 26.9–32)
MCHC RBC AUTO-ENTMCNC: 29.8 G/DL (ref 32.4–36.3)
MCV RBC AUTO: 92.9 FL (ref 80.5–98.2)
MONOCYTES # BLD AUTO: 0.93 10*3/MM3 (ref 0.2–1.2)
MONOCYTES NFR BLD AUTO: 9.7 % (ref 5–12)
NEUTROPHILS # BLD AUTO: 6.84 10*3/MM3 (ref 1.9–8.1)
NEUTROPHILS NFR BLD AUTO: 71.3 % (ref 42.7–76)
NITRITE UR QL STRIP: NEGATIVE
PH UR STRIP.AUTO: 7 [PH] (ref 5–8)
PLATELET # BLD AUTO: 294 10*3/MM3 (ref 140–500)
PMV BLD AUTO: 9.5 FL (ref 6–12)
PROT UR QL STRIP: ABNORMAL
RBC # BLD AUTO: 4.08 10*6/MM3 (ref 3.9–5.2)
RBC # UR: ABNORMAL /HPF
REF LAB TEST METHOD: ABNORMAL
SP GR UR STRIP: 1.01 (ref 1–1.03)
SQUAMOUS #/AREA URNS HPF: ABNORMAL /HPF
UROBILINOGEN UR QL STRIP: ABNORMAL
WBC NRBC COR # BLD: 9.6 10*3/MM3 (ref 4.5–10.7)
WBC UR QL AUTO: ABNORMAL /HPF

## 2018-04-10 PROCEDURE — 96376 TX/PRO/DX INJ SAME DRUG ADON: CPT

## 2018-04-10 PROCEDURE — G0378 HOSPITAL OBSERVATION PER HR: HCPCS

## 2018-04-10 PROCEDURE — 81001 URINALYSIS AUTO W/SCOPE: CPT | Performed by: HOSPITALIST

## 2018-04-10 PROCEDURE — 96375 TX/PRO/DX INJ NEW DRUG ADDON: CPT

## 2018-04-10 PROCEDURE — 25010000002 MORPHINE PER 10 MG: Performed by: HOSPITALIST

## 2018-04-10 PROCEDURE — 51702 INSERT TEMP BLADDER CATH: CPT

## 2018-04-10 PROCEDURE — 85025 COMPLETE CBC W/AUTO DIFF WBC: CPT | Performed by: HOSPITALIST

## 2018-04-10 RX ORDER — HYDROCODONE BITARTRATE AND ACETAMINOPHEN 5; 325 MG/1; MG/1
1 TABLET ORAL EVERY 6 HOURS PRN
Status: DISCONTINUED | OUTPATIENT
Start: 2018-04-10 | End: 2018-04-10 | Stop reason: HOSPADM

## 2018-04-10 RX ORDER — HYDROCODONE BITARTRATE AND ACETAMINOPHEN 5; 325 MG/1; MG/1
1 TABLET ORAL EVERY 6 HOURS PRN
Qty: 30 TABLET | Refills: 0 | Status: SHIPPED | OUTPATIENT
Start: 2018-04-10 | End: 2018-04-20

## 2018-04-10 RX ADMIN — MORPHINE SULFATE 2 MG: 2 INJECTION, SOLUTION INTRAMUSCULAR; INTRAVENOUS at 05:18

## 2018-04-10 RX ADMIN — FAMOTIDINE 20 MG: 10 INJECTION, SOLUTION INTRAVENOUS at 11:30

## 2018-04-10 RX ADMIN — MORPHINE SULFATE 2 MG: 2 INJECTION, SOLUTION INTRAMUSCULAR; INTRAVENOUS at 11:30

## 2018-04-10 NOTE — DISCHARGE INSTRUCTIONS
Call cardiologist and ask about Eliquis (related to fall/bleeding risk) and digoxin (holding for now, digoxin level on admission was 2.5, do they want a repeat level drawn by home health???)

## 2018-04-10 NOTE — PROGRESS NOTES
Continued Stay Note  [facility]     Patient Name: Lacy Valerio  MRN: [id number]  Today's Date: 4/10/2018    Admit Date: 4/9/2018          Discharge Plan     Row Name 04/10/18 1717       Plan    Plan Comments Pt becomes more confused in the evening, family does not want to wait for d/c orders (ambulance to  at 1730). Dr. William notified of d/c plan. Admitting MD off campus, MD to enter d/c remotely.     Row Name 04/10/18 4677       Plan    Plan Home w/ family, HH and 24 hr caregiver    Patient/Family in Agreement with Plan yes    Plan Comments Met w/ pt's son/POA at the bedside ([facility] [phone], Cell [phone] ). Pt/family have elected non surgical management, they would like to d/c home via ambulance. Pt lives w/ her son, she has 24hr home care w/ [facility], she is current w/ [facility] (Kelli notified).      Final Discharge Disposition Code 06 - home with home health care    Final Note D/c home via [facility] w/ 24 hr caregivers and family. [facility] notified.               Discharge Codes    No documentation.       Expected Discharge Date and Time     Expected Discharge Date Expected Discharge Time    Apr 10, 2018             Rebeka Velásquez RN

## 2018-04-10 NOTE — PLAN OF CARE
Problem: Patient Care Overview  Goal: Plan of Care Review  Outcome: Ongoing (interventions implemented as appropriate)   04/10/18 0256   Coping/Psychosocial   Plan of Care Reviewed With patient   Plan of Care Review   Progress improving   OTHER   Outcome Summary Pt admitted through ER with left hip fx. Alert with confusion r/t to dementia. Able to assist with turns. Purewick in place. vss, ra, sl. Cardiology consulted for cardiac clearance. Dr William consulted. Pt's son at bedside, advised he would like to discuss with dr william re: surgery r/t increased problems with anesthesia last time. Pt NPO after midnight at this time. turned routinely. pleasant and cooperative. educated on bp monitoring r/t h/o htn      Goal: Individualization and Mutuality  Outcome: Ongoing (interventions implemented as appropriate)      Problem: Fall Risk (Adult)  Goal: Identify Related Risk Factors and Signs and Symptoms  Outcome: Outcome(s) achieved Date Met: 04/10/18    Goal: Absence of Fall  Outcome: Ongoing (interventions implemented as appropriate)      Problem: Skin Injury Risk (Adult)  Goal: Identify Related Risk Factors and Signs and Symptoms  Outcome: Ongoing (interventions implemented as appropriate)    Goal: Skin Health and Integrity  Outcome: Ongoing (interventions implemented as appropriate)      Problem: Fractured Hip (Adult)  Goal: Signs and Symptoms of Listed Potential Problems Will be Absent, Minimized or Managed (Fractured Hip)  Outcome: Ongoing (interventions implemented as appropriate)

## 2018-04-10 NOTE — CONSULTS
[facility]  Cardiology Consult Note    Patient Identification:  Name: Lacy Valerio  Age: 88 y.o.  Sex: female  :  1929  MRN: [id number]             Requesting Physician: Dr Ramesh    Reason for Consultation / Chief Complaint: Surgical clearance    History of Present Illness:     HPI & PMH have been obtained exclusively from review of medical records due to patient confusion / disorientation    This patient is an 88-year-old female originally seen by our service in 2018 for cardiac clearance prior to hip surgery.  Her primary cardiologist, Dr. Daugherty follows her for persistent atrial fibrillation managed with anticoagulation (Eliquis) and rate control (Toprol, Lanoxin), CAD and essential hypertension.  She had a MI with bare-metal stent placed one day before CABG in .  No documented cardiac cath that time.  2-D echo  (see below) showed preserved LV systolic function with moderate to severe valvular heart disease   She has PAD with previous stroke, TIA and carotid stent in     This patient presented to the emergency room with complaints of left hip pain yesterday evening after falling out of bed onto her left side.  The pain was exacerbated with movement.  Her son who lives with her and brought her to the ER denied any loss of consciousness, complaints of head pain, chest pain, nausea, or vomiting.  Workup shows evidence of an acute proximal left femoral fracture.  Orthopedic evaluation is pending.  We have been asked to evaluate this patient for cardiac clearance prior to surgical intervention.  Her last dose of Eliquis was yesterday.  Dig level 2.5    Admission EKG (see below) shows atrial fibrillation with controlled ventricular rate, diffuse NSSTTW abnormalities.      Comorbid cardiac risk factors: Hypertension, dyslipidemia    Past Medical History:  Past Medical History:   Diagnosis Date   • Aortic valve regurgitation    • Atrial fibrillation    • CAD (coronary  artery disease)    • Compression fracture of thoracic vertebra    • Hyperlipidemia    • Hypertension    • MI (myocardial infarction)    • Stroke    • TIA (transient ischemic attack)      Past Surgical History:  Past Surgical History:   Procedure Laterality Date   • CARDIAC CATHETERIZATION     • CARDIAC SURGERY     • CAROTID STENT     • CORONARY ARTERY BYPASS GRAFT     • HIP ENDOPROSTHESIS Right 2/21/2018    Procedure: RT. HIP ENDOPROSTHESIS ANTERIOR;  Surgeon: Tyler Tolliver MD;  Location: [facility];  Service:    • HYSTERECTOMY     • JOINT REPLACEMENT Bilateral     hip   • PARTIAL HIP ARTHROPLASTY Left       Allergies:  Allergies   Allergen Reactions   • Penicillins Unknown (See Comments)     Pt does not remember reaction.   • Procainamide Hcl Unknown (See Comments)     Pt does not remember reaction.   • Procaine Unknown (See Comments)     Pt does not remember reaction.   • Sulfa Antibiotics Unknown (See Comments)     pt does not remember reaction     Home Meds:  Prescriptions Prior to Admission   Medication Sig Dispense Refill Last Dose   • amLODIPine (NORVASC) 5 MG tablet Take 5 mg by mouth Daily.   4/9/2018 at am   • apixaban (ELIQUIS) 2.5 MG tablet tablet Take 2.5 mg by mouth 2 (Two) Times a Day.   4/9/2018 at Unknown time   • aspirin 81 MG chewable tablet Chew 81 mg Daily.   4/9/2018 at Unknown time   • atorvastatin (LIPITOR) 10 MG tablet Take 10 mg by mouth Daily.   4/9/2018 at Unknown time   • digoxin (LANOXIN) 125 MCG tablet Take 125 mcg by mouth Daily.   4/9/2018 at Unknown time   • isosorbide mononitrate (IMDUR) 30 MG 24 hr tablet Take 30 mg by mouth Daily.   4/9/2018 at Unknown time   • metoprolol succinate XL (TOPROL-XL) 50 MG 24 hr tablet Take 50 mg by mouth Daily.   4/9/2018 at Unknown time   • pantoprazole (PROTONIX) 40 MG EC tablet Take 40 mg by mouth Daily.   4/9/2018 at Unknown time     Current Meds:   [unfilled]  Social History:   Social History   Substance Use Topics   • Smoking status: Never  Smoker   • Smokeless tobacco: Never Used   • Alcohol use No      Family History:  History reviewed. No pertinent family history.     Review of Systems: Able to obtain due to patient confusion    Constitutional:    Eyes:    ENT/oropharynx:    Cardiovascular:    Respiratory:    Gastrointestinal:    Genitourinary:    Neurological:    Musculoskeletal:    Integument:            Constitutional:  Temp:  [97.1 °F (36.2 °C)-98.6 °F (37 °C)] 98.6 °F (37 °C)  Heart Rate:  [59-92] 85  Resp:  [16] 16  BP: (110-135)/(62-77) 135/77    Physical Exam   General: Awake, alert elderly white female resting quietly Appears frail, but in no acute distress  Eyes: PERTL,  HEENT:  No JVD. Thyroid not visibly enlarged. No mucosal pallor or cyanosis  Respiratory: Respirations regular and unlabored at rest. BBS with slightly diminished air entry in bases. No crackles, rubs or wheezes auscultated  Cardiovascular: S1S2 irregular rate and rhythm. II-III/VI systolic murmur along LSB without rub or gallop auscultated. No carotid bruits. DP/PT pulses  No pretibial pitting edema  Gastrointestinal: Abdomen soft, flat, non tender. Bowel sounds present.  No ascites  Musculoskeletal: STEVEN x4 with limited ROM LLE No abnormal movements   Extremities: No digital clubbing or cyanosis  Skin: Color pale, pink. Skin warm and dry to touch. No rashes    Neuro: AAO to person only      Cardiographics  Admission ECG:       Comparison EKG 2-:        Echocardiogram 2-2018:   · Left atrial cavity size is moderately dilated.  · There is calcification of the aortic valve.  · Mild to moderate aortic valve regurgitation is present.  · Mild aortic valve stenosis is present.  · Moderate-to-severe mitral valve stenosis is present  · Moderate to severe tricuspid valve regurgitation is present.  · Mild pulmonic valve regurgitation is present.  · Left Ventricle: Calculated EF = 58.3%.  · There is no evidence of pericardial effusion    Imaging  Chest X-ray IMPRESSION:  No  focal pulmonary consolidation. Mild cardiomegaly.  Tortuous appearing aorta, as described. Follow-up/further evaluation can be obtained as indicated     Left hip Xray IMPRESSION:   Acute fracture of the proximal left femoral shaft.   An ill-defined mixed density expansile process of the proximal tibial shaft, further evaluation/follow-up recommended as indicated.    Lab Review             Results from last 7 days  Lab Units 04/09/18  1733   SODIUM mmol/L 141   POTASSIUM mmol/L 4.9   BUN mg/dL 16   CREATININE mg/dL 0.71   CALCIUM mg/dL 8.7     @[id number]@    Results from last 7 days  Lab Units 04/10/18  0642 04/09/18  1733   WBC 10*3/mm3 9.60 8.71   HEMOGLOBIN g/dL 11.3* 11.5*   HEMATOCRIT % 37.9 38.2   PLATELETS 10*3/mm3 294 326       Results from last 7 days  Lab Units 04/09/18  1733   INR  1.35*         Assessment:  - s/p (recurrent) mechanical fall  - acute proximal left femur fracture  - h/o fall 2-2018-> sx repair right hip fracture  - (Permanent) atrial fibrillation  - Dig toxicity  - Chronic anticoagulation-Eliquis  - mod-severe valvular heart disease 2-2018  - EF 55-60%  2-2018  - confusion    - CAD-CABG 1999  - HTN  - bilat carotid bruits  - PAD/PVD  - h/o TIA/CVA  - h/o carotid PCI 1999    Recommendations / Plan:     In summary, this is a 88-year-old white female on chronic anticoagulation for permanent A. fib admitted with recurrent fall and subsequent fracture of the left proximal femur which will require surgical intervention.  Recent 2-D echo showed preserved LV systolic function however she does have moderate-severe valvular heart disease.  She shows no clinical evidence fluid overload.  Digoxin has been stopped due to elevated serum levels.  Lopressor ordered (with parameters).  Patient will be cleared with higher than average surgical risks due to multiple, nonmodifiable risk factors.  IV Lopressor to be given on call to OR and telemetry bed ordered postoperatively.  Resume anticoagulation post  op when cleared by orthopedic surgery and will defer decision regarding risk/benefits of continued use of long term anticoagulation to her primary cardiologist, Dr. Daugherty    Labs/tests ordered: medictation adjustment,      Mary Parra, APRN  4/10/2018, 9:55 AM      EMR Dragon/Transcription:   Dictated utilizing Dragon dictation

## 2018-04-10 NOTE — CONSULTS
Orthopedic Consult      Patient: Lacy Valerio  YOB: 1929     Date of Admission: 4/9/2018  5:08 PM    Medical Record Number: [id number]    Attending Physician: Huseyin Ramesh MD    Consulting Physician: Kelin William MD    Reason for Consult: Left greater trochanter fracture    History of Present Illness: 88 y.o. female admitted to [facility] with Periprosthetic fracture of hip, initial encounter [M97.8XXA, Z96.649].     The patient was evaluated in the emergency room and was diagnosed with a   fracture.   Secondary to the age and multiple medical co morbidities the patient was admitted to the hospitalist.   As she is known to me from her previous left hip Bipolar prosthesis I was consulted for further evaluation and treatment.   The patient was in the usual state of health and fell from standing height in her home, Resulting in sudden onset  pain and inability to ambulate.   Denies any history of loss of consciousness, headache, vomiting, or seizures.   Denies any other injuries.   The patient is accompanied by her son  family members  to this hospital visit.     Patient is a  homeambulator. Patient  uses walker/cane assistive device.   The patient  lives at home with  Her son, has caretakers .  The patient has  history of dementia.    Patient denies any history of: DVT/PE, MRSA, COPD, CHF,  Diabetes mellitus, Dementia or.   The patient has history of : CAD,  A-Fib, stroke, MI, TIA  The patient is not on anticoagulants:       Past medical history, Past surgical history, family history, Social history, current medications, home medications Have been reviewed by me.    Past Medical History:   Diagnosis Date   • Aortic valve regurgitation    • Atrial fibrillation    • CAD (coronary artery disease)    • Compression fracture of thoracic vertebra    • Hyperlipidemia    • Hypertension    • MI (myocardial infarction)    • Stroke    • TIA (transient ischemic attack)      Past Surgical  History:   Procedure Laterality Date   • CARDIAC CATHETERIZATION     • CARDIAC SURGERY     • CAROTID STENT     • CORONARY ARTERY BYPASS GRAFT     • HIP ENDOPROSTHESIS Right 2/21/2018    Procedure: RT. HIP ENDOPROSTHESIS ANTERIOR;  Surgeon: Tyler Tolliver MD;  Location: [facility] OR;  Service:    • HYSTERECTOMY     • JOINT REPLACEMENT Bilateral     hip   • PARTIAL HIP ARTHROPLASTY Left      Social History     Occupational History   • Not on file.     Social History Main Topics   • Smoking status: Never Smoker   • Smokeless tobacco: Never Used   • Alcohol use No   • Drug use: No   • Sexual activity: Defer    Social History     Social History Narrative    Pt is unsure of what medical and surgical hx she has.     Pt is unaware of why she is on most of her medications as well.      History reviewed. No pertinent family history.       Allergies   Allergen Reactions   • Penicillins Unknown (See Comments)     Pt does not remember reaction.   • Procainamide Hcl Unknown (See Comments)     Pt does not remember reaction.   • Procaine Unknown (See Comments)     Pt does not remember reaction.   • Sulfa Antibiotics Unknown (See Comments)     pt does not remember reaction        Home Medications:  Prescriptions Prior to Admission   Medication Sig Dispense Refill Last Dose   • amLODIPine (NORVASC) 5 MG tablet Take 5 mg by mouth Daily.   4/9/2018 at am   • apixaban (ELIQUIS) 2.5 MG tablet tablet Take 2.5 mg by mouth 2 (Two) Times a Day.   4/9/2018 at Unknown time   • aspirin 81 MG chewable tablet Chew 81 mg Daily.   4/9/2018 at Unknown time   • atorvastatin (LIPITOR) 10 MG tablet Take 10 mg by mouth Daily.   4/9/2018 at Unknown time   • digoxin (LANOXIN) 125 MCG tablet Take 125 mcg by mouth Daily.   4/9/2018 at Unknown time   • isosorbide mononitrate (IMDUR) 30 MG 24 hr tablet Take 30 mg by mouth Daily.   4/9/2018 at Unknown time   • metoprolol succinate XL (TOPROL-XL) 50 MG 24 hr tablet Take 50 mg by mouth Daily.   4/9/2018 at  Unknown time   • pantoprazole (PROTONIX) 40 MG EC tablet Take 40 mg by mouth Daily.   4/9/2018 at Unknown time       Current Medications:  Scheduled Meds:  famotidine 20 mg Intravenous Daily   metoprolol tartrate 2.5 mg Intravenous Q12H     Continuous Infusions:   PRN Meds:.•  acetaminophen  •  HYDROcodone-acetaminophen  •  Morphine  •  ondansetron    Review of Systems:   A 12 point system review was reviewed with the patient and from the chart  and is negative except as mentioned in history of present illness.      Physical Exam: 88 y.o. female                    Vitals:    04/10/18 0323 04/10/18 0658 04/10/18 1036 04/10/18 1500   BP: 118/71 135/77 98/60 97/59   BP Location: Left arm Left arm Left arm Left arm   Patient Position: Lying Lying Lying Lying   Pulse: 92 85 74 89   Resp: 16 16 16 16   Temp: 97.2 °F (36.2 °C) 98.6 °F (37 °C) 98.1 °F (36.7 °C) 98.2 °F (36.8 °C)   TempSrc: Oral Oral Oral Oral   SpO2: 97% 96% 96% 95%   Weight:       Height:            Gait: Not evaluated.     Mental/HEENT/cardio/skin: The patient's general appearance was well-nourished, well-hydrated, no acute distress.  Orientation was alert and oriented × 0.  The patient's mood was normal.   Pulmonary exam shows normal late exchange, no labored breathing, or shortness of breath.    The  skin exam showed normal temperature and color in the area of examination.    Extremities: Left leg no shortening , movements are painful.     Pulses:  Pulses palpable and equal bilaterally    Diagnostic Tests:      Results from last 7 days  Lab Units 04/10/18  0642 04/09/18  1733   WBC 10*3/mm3 9.60 8.71   HEMOGLOBIN g/dL 11.3* 11.5*   HEMATOCRIT % 37.9 38.2   PLATELETS 10*3/mm3 294 326       Results from last 7 days  Lab Units 04/09/18  1733   SODIUM mmol/L 141   POTASSIUM mmol/L 4.9   CHLORIDE mmol/L 104   CO2 mmol/L 27.2   BUN mg/dL 16   CREATININE mg/dL 0.71   GLUCOSE mg/dL 107*   CALCIUM mg/dL 8.7       Results from last 7 days  Lab Units  04/09/18  1733   INR  1.35*     No results found for: URICACID  No results found for: CRYSTAL  Microbiology Results (last 10 days)     ** No results found for the last 240 hours. **        Xr Knee 1 Or 2 View Left    Result Date: 4/9/2018   Acute fracture of the proximal left femoral shaft.  An ill-defined mixed density expansile process of the proximal tibial shaft, further evaluation/follow-up recommended as indicated.  This report was finalized on 4/9/2018 4:03 PM by Dr. Hernando Horton MD.      Xr Chest 1 View    Result Date: 4/9/2018  No focal pulmonary consolidation. Mild cardiomegaly. Tortuous appearing aorta, as described. Follow-up/further evaluation can be obtained as indicated.  This report was finalized on 4/9/2018 6:45 PM by Dr. Hernando Horton MD.      Xr Hip With Or Without Pelvis 2 - 3 View Left    Result Date: 4/9/2018   Acute fracture of the proximal left femoral shaft.  An ill-defined mixed density expansile process of the proximal tibial shaft, further evaluation/follow-up recommended as indicated.  This report was finalized on 4/9/2018 4:03 PM by Dr. Hernando Horton MD.        The labs, X-ray results for preoperative evaluation have been reviewed by me.    Assessment:  Left femur proximal end fracture, periprosthetic [place] A  GT    Patient Active Problem List   Diagnosis   • Closed fracture of neck of right femur   • Brianna-prosthetic fracture around prosthetic hip       Plan:    Options and alternatives were discussed in detail with the patient and  Her son.   Patient has severe dementia and is high risk for surgical intervention.   The patient is indicated for non operative treatment.   Options hospice,/ going home with care takers at home discussed. Son would like to take her home, I think it is reasonable, as he has been taking care of her for sometime and has all arrangements at home.   Ok to DC home today from orthopaedic perspective.     Date: 4/10/2018    Kelin PEGUERO  MD Stewart     CC: Virginia Fernández MD; MD Huseyin Bauman MD

## 2018-04-10 NOTE — NURSING NOTE
Second call placed to Dr Francisco to inform her again of the patients sons insistence on leaving today. States that he is afraid she will get combative and more confused if she is forced to stay the night.  Dr William is ok with her discharging home with the son.

## 2018-04-10 NOTE — PROGRESS NOTES
Discharge Planning Assessment  [facility]     Patient Name: Lacy Valerio  MRN: [id number]  Today's Date: 4/10/2018    Admit Date: 4/9/2018          Discharge Needs Assessment     Row Name 04/10/18 7949       Living Environment    Lives With child(jacob), adult    Current Living Arrangements home/apartment/condo    Primary Care Provided by homecare agency    Provides Primary Care For no one, unable/limited ability to care for self    Family Caregiver if Needed child(jacob), adult    Quality of Family Relationships helpful;involved;supportive    Able to Return to Prior Arrangements yes       Resource/Environmental Concerns    Resource/Environmental Concerns none       Transition Planning    Patient/Family Anticipates Transition to home with family;home with help/services    Patient/Family Anticipated Services at Transition home health care       Discharge Needs Assessment    Readmission Within the Last 30 Days no previous admission in last 30 days    Equipment Currently Used at Home cane, straight    Offered/Gave Vendor List yes            Discharge Plan     Row Name 04/10/18 5285       Plan    Plan Home w/ family, HH and 24 hr caregiver    Patient/Family in Agreement with Plan yes    Plan Comments Met w/ pt's son/POA at the bedside (Kody [phone] cell [phone] ). Pt/family have elected non surgical management, they would like to d/c home via ambulance. Pt lives w/ her son, she has 24hr home care w/ [facility], she is current w/ [facility] (Kelli notified).      Final Discharge Disposition Code 06 - home with home health care    Final Note D/c home via [facility] w/ 24 hr caregivers and family. [facility] notified.         Destination     No service coordination in this encounter.      Durable Medical Equipment     No service coordination in this encounter.      Dialysis/Infusion     No service coordination in this encounter.      Home Medical Care - Selection Complete     Service  Request Status Selected Specialties Address Phone Number Fax Number    [facility] [address] [phone]        Rebeka Velásquez RN 4/10/2018 1647    Kelli notified .4/10/2018                   Social Care     No service coordination in this encounter.                Demographic Summary    No documentation.           Functional Status     Row Name 04/10/18 165       Functional Status    Usual Activity Tolerance fair    Current Activity Tolerance poor       Functional Status, IADL    Medications completely dependent    Meal Preparation completely dependent    Housekeeping completely dependent    Laundry completely dependent    Shopping completely dependent       Mental Status Summary    Recent Changes in Mental Status/Cognitive Functioning unable to assess            Psychosocial    No documentation.           Abuse/Neglect    No documentation.           Legal    No documentation.           Substance Abuse    No documentation.           Patient Forms     Row Name 04/10/18 1656       Patient Forms    Provider Choice List Delivered    Delivered to Support person    Method of delivery In person          Rebeka Velásquez RN

## 2018-04-10 NOTE — NURSING NOTE
Spoke with Dr Francisco, informed her that Dr William is ok with patient discharging home this evening.  Dr William is willing to write pain script for patient and expressed that patient does not handle hospitalization well and it would be best for her to go home sooner rather than later.  Dr Francisco states that she will put in for discharge when she gets to a computer.

## 2018-04-10 NOTE — H&P
HISTORY AND PHYSICAL   [facility]        Patient Identification:  Name: Lacy Valerio  Age: 88 y.o.   Sex: female  :  1929  MRN: [id number]                     Primary Care Physician: Virginia Fernández MD    Chief Complaint:  fall    History of Present Illness:   88 y.o. female who is s/p left hip arthroplasty performed in the past. Pt presented to the ED 18 with c/o left hip pain s/p fall sustained earlier. States she  slipped out of bed. Pt's left hip pain worsens with ambulation and improves with remaining still. Per son, pt did not lose consciousness s/p the fall. Pt denies head injury, abdominal pain, chest pain, dyspnea, nausea, vomiting, and neck pain. Pt reports that she is on Eliquis due to h/o A-Fib. There are no other complaints at this time. History obtained from son. Pt will not answer questions. States she does not have a broken leg. Mild paranoia present.    Past Medical History:  Past Medical History:   Diagnosis Date   • Aortic valve regurgitation    • Atrial fibrillation    • CAD (coronary artery disease)    • Compression fracture of thoracic vertebra    • Hyperlipidemia    • Hypertension    • MI (myocardial infarction)    • Stroke    • TIA (transient ischemic attack)      Past Surgical History:  Past Surgical History:   Procedure Laterality Date   • CARDIAC CATHETERIZATION     • CARDIAC SURGERY     • CAROTID STENT     • CORONARY ARTERY BYPASS GRAFT     • HIP ENDOPROSTHESIS Right 2018    Procedure: RT. HIP ENDOPROSTHESIS ANTERIOR;  Surgeon: Tyler Tolliver MD;  Location: [facility];  Service:    • HYSTERECTOMY     • JOINT REPLACEMENT Bilateral     hip   • PARTIAL HIP ARTHROPLASTY Left       Home Meds:  Prescriptions Prior to Admission   Medication Sig Dispense Refill Last Dose   • amLODIPine (NORVASC) 5 MG tablet Take 5 mg by mouth Daily.   2018 at am   • apixaban (ELIQUIS) 2.5 MG tablet tablet Take 2.5 mg by mouth 2 (Two) Times a Day.   2018 at Unknown  time   • aspirin 81 MG chewable tablet Chew 81 mg Daily.   4/9/2018 at Unknown time   • atorvastatin (LIPITOR) 10 MG tablet Take 10 mg by mouth Daily.   4/9/2018 at Unknown time   • digoxin (LANOXIN) 125 MCG tablet Take 125 mcg by mouth Daily.   4/9/2018 at Unknown time   • isosorbide mononitrate (IMDUR) 30 MG 24 hr tablet Take 30 mg by mouth Daily.   4/9/2018 at Unknown time   • metoprolol succinate XL (TOPROL-XL) 50 MG 24 hr tablet Take 50 mg by mouth Daily.   4/9/2018 at Unknown time   • pantoprazole (PROTONIX) 40 MG EC tablet Take 40 mg by mouth Daily.   4/9/2018 at Unknown time       Allergies:  Allergies   Allergen Reactions   • Penicillins Unknown (See Comments)     Pt does not remember reaction.   • Procainamide Hcl Unknown (See Comments)     Pt does not remember reaction.   • Procaine Unknown (See Comments)     Pt does not remember reaction.   • Sulfa Antibiotics Unknown (See Comments)     pt does not remember reaction     Immunizations:    There is no immunization history on file for this patient.  Social History:   Social History     Social History Narrative    Pt is unsure of what medical and surgical hx she has.     Pt is unaware of why she is on most of her medications as well.      Social History     Social History   • Marital status: Single     Spouse name: N/A   • Number of children: N/A   • Years of education: N/A     Occupational History   • Not on file.     Social History Main Topics   • Smoking status: Never Smoker   • Smokeless tobacco: Never Used   • Alcohol use No   • Drug use: No   • Sexual activity: Defer     Other Topics Concern   • Not on file     Social History Narrative    Pt is unsure of what medical and surgical hx she has.     Pt is unaware of why she is on most of her medications as well.        Family History:  History reviewed. No pertinent family history.     Review of Systems  See history of present illness and past medical history.     Objective:  tMax 24 hrs: Temp (24hrs),  "Av.8 °F (36.6 °C), Min:97.1 °F (36.2 °C), Max:98.6 °F (37 °C)    Vitals Ranges:   Temp:  [97.1 °F (36.2 °C)-98.6 °F (37 °C)] 98.6 °F (37 °C)  Heart Rate:  [59-92] 85  Resp:  [16] 16  BP: (110-135)/(62-77) 135/77      Exam:  /77 (BP Location: Left arm, Patient Position: Lying)   Pulse 85   Temp 98.6 °F (37 °C) (Oral)   Resp 16   Ht 165.1 cm (65\")   Wt 38.9 kg (85 lb 12.1 oz)   SpO2 96%   BMI 14.27 kg/m²     General Appearance:    Alert, uncooperative, no distress, appears stated age. Confused. Oriented to person only   Head:    Normocephalic, without obvious abnormality, atraumatic   Eyes:    PERRL, conjunctiva/corneas clear, EOM's intact, both eyes   Ears:    Normal external ear canals, both ears   Nose:   Nares normal, septum midline, mucosa normal, no drainage    or sinus tenderness   Throat:   Lips, mucosa, and tongue normal   Neck:   Supple, symmetrical, trachea midline, no adenopathy;     thyroid:  no enlargement/tenderness/nodules; no carotid    bruit or JVD   Back:     , no CVA tenderness   Lungs:     Clear to auscultation bilaterally, respirations unlabored   Chest Wall:    No tenderness or deformity    Heart:    Regular rate, irregular rhythm, S1 and S2 normal, no murmur, rub or gallop   Abdomen:     Scaphoid.Soft, non-tender, bowel sounds active all four quadrants,     no masses, no hepatomegaly, no splenomegaly   Extremities:   L thighTTP. Extremities otherwise normal, atraumatic, no cyanosis or edema   Pulses:   2+ and symmetric all extremities   Skin:   Skin color, texture, turgor normal, no rashes or lesions   Lymph nodes:   Cervical, supraclavicular, and axillary nodes normal   Neurologic:   CNII-XII grossly intact, moves all extremities. Pain with movement of LLE, sensation intact throughout      .     Data Review:  Lab Results   Component Value Date    WBC 9.60 04/10/2018    HGB 11.3 (L) 04/10/2018    HCT 37.9 04/10/2018     04/10/2018     Lab Results   Component Value Date "     04/09/2018    K 4.9 04/09/2018     04/09/2018    CO2 27.2 04/09/2018    BUN 16 04/09/2018    CREATININE 0.71 04/09/2018    GLUCOSE 107 (H) 04/09/2018     Lab Results   Component Value Date    CALCIUM 8.7 04/09/2018     Lab Results   Component Value Date    AST 19 04/09/2018    ALT 11 04/09/2018    ALKPHOS 98 04/09/2018     Lab Results   Component Value Date    INR 1.35 (H) 04/09/2018     Lab Results   Component Value Date    COLORU Yellow 04/10/2018    CLARITYU Cloudy (A) 04/10/2018    PHUR 7.0 04/10/2018    GLUCOSEU Negative 04/10/2018    KETONESU Negative 04/10/2018    BLOODU Small (1+) (A) 04/10/2018    LEUKOCYTESUR Moderate (2+) (A) 04/10/2018    BILIRUBINUR Negative 04/10/2018    UROBILINOGEN 0.2 E.U./dL 04/10/2018    RBCUA 0-2 04/10/2018    WBCUA Too Numerous to Count (A) 04/10/2018    BACTERIA None Seen 04/10/2018     No results found for: TROPONINT, TROPONINI, BNP  No components found for: HGBA1C;2  No components found for: TSH;2               Imaging Results (all)     Procedure Component Value Units Date/Time    XR Chest 1 View [[id number]] Collected:  04/09/18 1842     Updated:  04/09/18 1848    Narrative:       XR CHEST 1 VW-     HISTORY: Female who is 88 years-old,  preoperative evaluation     TECHNIQUE: Frontal view of the chest     COMPARISON: 2/20/2018     FINDINGS: Patient is rotated towards the right, that may contribute to  exaggeration of appearance of aortic tortuosity; if further evaluation  of the aorta is indicated, CT could be obtained. The heart appears  mildly enlarged. Sternotomy wires are seen. Large hiatal  hernia/intrathoracic stomach is apparent. No focal pulmonary  consolidation, pleural effusion, or pneumothorax. Otherwise stable.       Impression:       No focal pulmonary consolidation. Mild cardiomegaly.  Tortuous appearing aorta, as described. Follow-up/further evaluation can  be obtained as indicated.     This report was finalized on 4/9/2018 6:45 PM by Dr. Villagomez  LUDIN Horton MD.       XR Hip With or Without Pelvis 2 - 3 View Left [[id number]] Collected:  04/09/18 1557     Updated:  04/09/18 1606    Narrative:       XR HIP W OR WO PELVIS 2-3 VIEW LEFT-, XR KNEE 1 OR 2 VW LEFT-     INDICATIONS:    Trauma     TECHNIQUE: FRONTAL VIEW OF THE PELVIS, 2 VIEWS OF THE LEFT HIP, 2 VIEWS  OF THE LEFT KNEE     COMPARISON: 2/20/2018     FINDINGS:      Acute fracture of the proximal left femoral shaft is noted adjacent to  the proximal femoral hemiarthroplasty component, change in appearance  from the prior exam. No other acute fractures are noted. Chronic  deformities of inferior pubic rami. Right proximal femoral  hemiarthroplasty hardware is also noted. Arterial calcifications are  present.     Moderate medial tibiofemoral joint space narrowing is seen. Chondral  calcification is evident at the knee.     An ill-defined expansile process of the proximal tibial shaft shows  mixed density without obvious periosteal reaction or cortical  breakthrough, MRI can be obtained for further evaluation if not  contraindicated, interval follow-up is also advised to characterize  change.       Impression:          Acute fracture of the proximal left femoral shaft.     An ill-defined mixed density expansile process of the proximal tibial  shaft, further evaluation/follow-up recommended as indicated.     This report was finalized on 4/9/2018 4:03 PM by Dr. Hernando Horton MD.       XR Knee 1 or 2 View Left [[id number]] Collected:  04/09/18 1557     Updated:  04/09/18 1606    Narrative:       XR HIP W OR WO PELVIS 2-3 VIEW LEFT-, XR KNEE 1 OR 2 VW LEFT-     INDICATIONS:    Trauma     TECHNIQUE: FRONTAL VIEW OF THE PELVIS, 2 VIEWS OF THE LEFT HIP, 2 VIEWS  OF THE LEFT KNEE     COMPARISON: 2/20/2018     FINDINGS:      Acute fracture of the proximal left femoral shaft is noted adjacent to  the proximal femoral hemiarthroplasty component, change in appearance  from the prior exam. No other acute  fractures are noted. Chronic  deformities of inferior pubic rami. Right proximal femoral  hemiarthroplasty hardware is also noted. Arterial calcifications are  present.     Moderate medial tibiofemoral joint space narrowing is seen. Chondral  calcification is evident at the knee.     An ill-defined expansile process of the proximal tibial shaft shows  mixed density without obvious periosteal reaction or cortical  breakthrough, MRI can be obtained for further evaluation if not  contraindicated, interval follow-up is also advised to characterize  change.       Impression:          Acute fracture of the proximal left femoral shaft.     An ill-defined mixed density expansile process of the proximal tibial  shaft, further evaluation/follow-up recommended as indicated.     This report was finalized on 4/9/2018 4:03 PM by Dr. Hernando Horton MD.           Patient Active Problem List   Diagnosis Code   • Closed fracture of neck of right femur S72.001A   • Brianna-prosthetic fracture around prosthetic hip M97.8XXA, Z96.649       Assessment:  Active Problems:    Brianna-prosthetic fracture around prosthetic hip    Chronic AFib, on eliquis    Mild volume depletion    Dig toxicity    Possible UTI - afebrile, no leukocytosis    CAD    Plan:  Admitted to ortho floor  Ortho consulted in ED  No Abx for now. Follow culture results  Hold eliquis  Hold dig  Cardio consult for pre-op clearance  GI prophylaxis  Symptom management      Bouchra Francisco MD  4/10/2018  9:15 AM   This will serve as both H&P and discharge summary. After Ortho consultant  saw pt on 4/10 and recommended no surgery, patient's son elected to take her home and asked to be discharged. He was concerned that hospitalization would exacerbate her dementia-related behaviors.

## 2018-04-10 NOTE — DISCHARGE PLACEMENT REQUEST
"Jess Valerio (88 y.o. Female)     Date of Birth Social Security Number Address Home Phone MRN    05/18/1929  [id number] [id number] [phone] [id number]    [employer] Marital Status          Unknown Single       Admission Date Admission Type Admitting Provider Attending Provider Department, Room/Bed    4/9/18 Emergency Huseyin Ramesh MD Ahmed, Aftab, MD [address], P799/1    Discharge Date Discharge Disposition Discharge Destination                       Attending Provider:  Huseyin Ramesh MD    Allergies:  Penicillins, Procainamide Hcl, Procaine, Sulfa Antibiotics    Isolation:  None   Infection:  None   Code Status:  FULL    Ht:  165.1 cm (65\")   Wt:  38.9 kg (85 lb 12.1 oz)    Admission Cmt:  None   Principal Problem:  None                Active Insurance as of 4/9/2018     Primary Coverage     Payor Plan Insurance Group Employer/Plan Group    HUMANA MEDICARE REPLACEMENT HUMANA MEDICARE REPL [id number]     Payor Plan Address Payor Plan Phone Number Effective From Effective To    [address] [address] [phone] 1/1/2013     [place] [address]       Subscriber Name Subscriber Birth Date Member ID       JESS VALERIO 5/18/1929 [id number]                 Emergency Contacts      (Rel.) Home Phone Work Phone Mobile Phone    Kody Valerio (Son) -- -- [phone]    Braxton Valerio (Son) -- -- [phone]    CrespoAnia (Sister) [phone] -- --    Lorne Ta (Friend) [phone] -- --              "

## 2019-02-27 ENCOUNTER — APPOINTMENT (OUTPATIENT)
Dept: GENERAL RADIOLOGY | Facility: HOSPITAL | Age: 84
End: 2019-02-27

## 2019-02-27 ENCOUNTER — HOSPITAL ENCOUNTER (OUTPATIENT)
Facility: HOSPITAL | Age: 84
Setting detail: OBSERVATION
Discharge: HOME | End: 2019-03-04
Attending: EMERGENCY MEDICINE | Admitting: HOSPITALIST

## 2019-02-27 DIAGNOSIS — Z74.09 IMPAIRED FUNCTIONAL MOBILITY AND ENDURANCE: ICD-10-CM

## 2019-02-27 DIAGNOSIS — J10.1 INFLUENZA A: ICD-10-CM

## 2019-02-27 DIAGNOSIS — J18.9 COMMUNITY ACQUIRED PNEUMONIA, UNSPECIFIED LATERALITY: Primary | ICD-10-CM

## 2019-02-27 LAB
ALBUMIN SERPL-MCNC: 4.2 G/DL (ref 3.5–5.2)
ALBUMIN/GLOB SERPL: 1.1 G/DL
ALP SERPL-CCNC: 80 U/L (ref 39–117)
ALT SERPL W P-5'-P-CCNC: 10 U/L (ref 1–33)
ANION GAP SERPL CALCULATED.3IONS-SCNC: 13.9 MMOL/L
AST SERPL-CCNC: 21 U/L (ref 1–32)
B PARAPERT DNA SPEC QL NAA+PROBE: NOT DETECTED
B PERT DNA SPEC QL NAA+PROBE: NOT DETECTED
BACTERIA UR QL AUTO: ABNORMAL /HPF
BASOPHILS # BLD AUTO: 0.04 10*3/MM3 (ref 0–0.2)
BASOPHILS NFR BLD AUTO: 0.5 % (ref 0–1.5)
BILIRUB SERPL-MCNC: 0.9 MG/DL (ref 0.1–1.2)
BILIRUB UR QL STRIP: NEGATIVE
BUN BLD-MCNC: 24 MG/DL (ref 8–23)
BUN/CREAT SERPL: 27 (ref 7–25)
C PNEUM DNA NPH QL NAA+NON-PROBE: NOT DETECTED
CALCIUM SPEC-SCNC: 10 MG/DL (ref 8.6–10.5)
CHLORIDE SERPL-SCNC: 99 MMOL/L (ref 98–107)
CLARITY UR: CLEAR
CO2 SERPL-SCNC: 25.1 MMOL/L (ref 22–29)
COLOR UR: ABNORMAL
CREAT BLD-MCNC: 0.89 MG/DL (ref 0.57–1)
D-LACTATE SERPL-SCNC: 1.7 MMOL/L (ref 0.5–2)
DEPRECATED RDW RBC AUTO: 45.9 FL (ref 37–54)
EOSINOPHIL # BLD AUTO: 0.11 10*3/MM3 (ref 0–0.4)
EOSINOPHIL NFR BLD AUTO: 1.4 % (ref 0.3–6.2)
ERYTHROCYTE [DISTWIDTH] IN BLOOD BY AUTOMATED COUNT: 14.3 % (ref 12.3–15.4)
FLUAV H1 2009 PAND RNA NPH QL NAA+PROBE: NOT DETECTED
FLUAV H1 HA GENE NPH QL NAA+PROBE: NOT DETECTED
FLUAV H3 RNA NPH QL NAA+PROBE: DETECTED
FLUBV RNA ISLT QL NAA+PROBE: NOT DETECTED
GFR SERPL CREATININE-BSD FRML MDRD: 60 ML/MIN/1.73
GLOBULIN UR ELPH-MCNC: 3.9 GM/DL
GLUCOSE BLD-MCNC: 121 MG/DL (ref 65–99)
GLUCOSE UR STRIP-MCNC: NEGATIVE MG/DL
HADV DNA SPEC NAA+PROBE: NOT DETECTED
HCOV 229E RNA SPEC QL NAA+PROBE: NOT DETECTED
HCOV HKU1 RNA SPEC QL NAA+PROBE: NOT DETECTED
HCOV NL63 RNA SPEC QL NAA+PROBE: NOT DETECTED
HCOV OC43 RNA SPEC QL NAA+PROBE: NOT DETECTED
HCT VFR BLD AUTO: 45.1 % (ref 34–46.6)
HGB BLD-MCNC: 13.9 G/DL (ref 12–15.9)
HGB UR QL STRIP.AUTO: NEGATIVE
HMPV RNA NPH QL NAA+NON-PROBE: NOT DETECTED
HOLD SPECIMEN: NORMAL
HOLD SPECIMEN: NORMAL
HPIV1 RNA SPEC QL NAA+PROBE: NOT DETECTED
HPIV2 RNA SPEC QL NAA+PROBE: NOT DETECTED
HPIV3 RNA NPH QL NAA+PROBE: NOT DETECTED
HPIV4 P GENE NPH QL NAA+PROBE: NOT DETECTED
HYALINE CASTS UR QL AUTO: ABNORMAL /LPF
IMM GRANULOCYTES # BLD AUTO: 0.06 10*3/MM3 (ref 0–0.05)
IMM GRANULOCYTES NFR BLD AUTO: 0.8 % (ref 0–0.5)
KETONES UR QL STRIP: ABNORMAL
LEUKOCYTE ESTERASE UR QL STRIP.AUTO: ABNORMAL
LYMPHOCYTES # BLD AUTO: 1.56 10*3/MM3 (ref 0.7–3.1)
LYMPHOCYTES NFR BLD AUTO: 19.5 % (ref 19.6–45.3)
M PNEUMO IGG SER IA-ACNC: NOT DETECTED
MCH RBC QN AUTO: 26.6 PG (ref 26.6–33)
MCHC RBC AUTO-ENTMCNC: 30.8 G/DL (ref 31.5–35.7)
MCV RBC AUTO: 86.4 FL (ref 79–97)
MONOCYTES # BLD AUTO: 0.66 10*3/MM3 (ref 0.1–0.9)
MONOCYTES NFR BLD AUTO: 8.3 % (ref 5–12)
NEUTROPHILS # BLD AUTO: 5.57 10*3/MM3 (ref 1.4–7)
NEUTROPHILS NFR BLD AUTO: 69.5 % (ref 42.7–76)
NITRITE UR QL STRIP: NEGATIVE
NRBC BLD AUTO-RTO: 0 /100 WBC (ref 0–0)
PH UR STRIP.AUTO: 5.5 [PH] (ref 5–8)
PLATELET # BLD AUTO: 388 10*3/MM3 (ref 140–450)
PMV BLD AUTO: 9.4 FL (ref 6–12)
POTASSIUM BLD-SCNC: 3.2 MMOL/L (ref 3.5–5.2)
PROCALCITONIN SERPL-MCNC: 0.11 NG/ML (ref 0.1–0.25)
PROT SERPL-MCNC: 8.1 G/DL (ref 6–8.5)
PROT UR QL STRIP: ABNORMAL
RBC # BLD AUTO: 5.22 10*6/MM3 (ref 3.77–5.28)
RBC # UR: ABNORMAL /HPF
REF LAB TEST METHOD: ABNORMAL
RHINOVIRUS RNA SPEC NAA+PROBE: NOT DETECTED
RSV RNA NPH QL NAA+NON-PROBE: NOT DETECTED
SODIUM BLD-SCNC: 138 MMOL/L (ref 136–145)
SP GR UR STRIP: 1.02 (ref 1–1.03)
SQUAMOUS #/AREA URNS HPF: ABNORMAL /HPF
UROBILINOGEN UR QL STRIP: ABNORMAL
WBC NRBC COR # BLD: 8 10*3/MM3 (ref 3.4–10.8)
WBC UR QL AUTO: ABNORMAL /HPF
WHOLE BLOOD HOLD SPECIMEN: NORMAL
WHOLE BLOOD HOLD SPECIMEN: NORMAL

## 2019-02-27 PROCEDURE — 94799 UNLISTED PULMONARY SVC/PX: CPT

## 2019-02-27 PROCEDURE — 94640 AIRWAY INHALATION TREATMENT: CPT

## 2019-02-27 PROCEDURE — 25010000002 CEFTRIAXONE PER 250 MG: Performed by: NURSE PRACTITIONER

## 2019-02-27 PROCEDURE — 84145 PROCALCITONIN (PCT): CPT | Performed by: NURSE PRACTITIONER

## 2019-02-27 PROCEDURE — G0378 HOSPITAL OBSERVATION PER HR: HCPCS

## 2019-02-27 PROCEDURE — 96365 THER/PROPH/DIAG IV INF INIT: CPT

## 2019-02-27 PROCEDURE — 25010000002 AZITHROMYCIN PER 500 MG: Performed by: NURSE PRACTITIONER

## 2019-02-27 PROCEDURE — 83605 ASSAY OF LACTIC ACID: CPT | Performed by: NURSE PRACTITIONER

## 2019-02-27 PROCEDURE — 87040 BLOOD CULTURE FOR BACTERIA: CPT | Performed by: NURSE PRACTITIONER

## 2019-02-27 PROCEDURE — 25810000003 SODIUM CHLORIDE 0.9 % WITH KCL 20 MEQ 20-0.9 MEQ/L-% SOLUTION: Performed by: HOSPITALIST

## 2019-02-27 PROCEDURE — 71046 X-RAY EXAM CHEST 2 VIEWS: CPT

## 2019-02-27 PROCEDURE — 80053 COMPREHEN METABOLIC PANEL: CPT | Performed by: NURSE PRACTITIONER

## 2019-02-27 PROCEDURE — 99284 EMERGENCY DEPT VISIT MOD MDM: CPT

## 2019-02-27 PROCEDURE — 87486 CHLMYD PNEUM DNA AMP PROBE: CPT | Performed by: NURSE PRACTITIONER

## 2019-02-27 PROCEDURE — 96375 TX/PRO/DX INJ NEW DRUG ADDON: CPT

## 2019-02-27 PROCEDURE — 25010000002 METHYLPREDNISOLONE PER 125 MG: Performed by: NURSE PRACTITIONER

## 2019-02-27 PROCEDURE — 87581 M.PNEUMON DNA AMP PROBE: CPT | Performed by: NURSE PRACTITIONER

## 2019-02-27 PROCEDURE — 85025 COMPLETE CBC W/AUTO DIFF WBC: CPT | Performed by: NURSE PRACTITIONER

## 2019-02-27 PROCEDURE — 87631 RESP VIRUS 3-5 TARGETS: CPT | Performed by: NURSE PRACTITIONER

## 2019-02-27 PROCEDURE — 87798 DETECT AGENT NOS DNA AMP: CPT | Performed by: NURSE PRACTITIONER

## 2019-02-27 PROCEDURE — 81001 URINALYSIS AUTO W/SCOPE: CPT | Performed by: NURSE PRACTITIONER

## 2019-02-27 RX ORDER — OSELTAMIVIR PHOSPHATE 75 MG/1
75 CAPSULE ORAL EVERY 12 HOURS SCHEDULED
Status: DISCONTINUED | OUTPATIENT
Start: 2019-02-27 | End: 2019-02-27 | Stop reason: DRUGHIGH

## 2019-02-27 RX ORDER — TIZANIDINE 4 MG/1
2 TABLET ORAL NIGHTLY PRN
Status: DISCONTINUED | OUTPATIENT
Start: 2019-02-27 | End: 2019-03-04

## 2019-02-27 RX ORDER — METHYLPREDNISOLONE SODIUM SUCCINATE 125 MG/2ML
125 INJECTION, POWDER, LYOPHILIZED, FOR SOLUTION INTRAMUSCULAR; INTRAVENOUS ONCE
Status: DISCONTINUED | OUTPATIENT
Start: 2019-02-27 | End: 2019-02-27

## 2019-02-27 RX ORDER — METHYLPREDNISOLONE SODIUM SUCCINATE 125 MG/2ML
125 INJECTION, POWDER, LYOPHILIZED, FOR SOLUTION INTRAMUSCULAR; INTRAVENOUS ONCE
Status: COMPLETED | OUTPATIENT
Start: 2019-02-27 | End: 2019-02-27

## 2019-02-27 RX ORDER — SODIUM CHLORIDE 0.9 % (FLUSH) 0.9 %
10 SYRINGE (ML) INJECTION AS NEEDED
Status: DISCONTINUED | OUTPATIENT
Start: 2019-02-27 | End: 2019-03-04 | Stop reason: HOSPADM

## 2019-02-27 RX ORDER — DIGOXIN 125 MCG
125 TABLET ORAL
COMMUNITY
End: 2019-03-04 | Stop reason: HOSPADM

## 2019-02-27 RX ORDER — CHOLECALCIFEROL (VITAMIN D3) 125 MCG
500 CAPSULE ORAL DAILY
COMMUNITY

## 2019-02-27 RX ORDER — CEFTRIAXONE SODIUM 1 G/50ML
1 INJECTION, SOLUTION INTRAVENOUS ONCE
Status: COMPLETED | OUTPATIENT
Start: 2019-02-27 | End: 2019-02-27

## 2019-02-27 RX ORDER — GUAIFENESIN 600 MG/1
600 TABLET, EXTENDED RELEASE ORAL EVERY 12 HOURS SCHEDULED
Status: DISCONTINUED | OUTPATIENT
Start: 2019-02-27 | End: 2019-03-04 | Stop reason: HOSPADM

## 2019-02-27 RX ORDER — SENNA AND DOCUSATE SODIUM 50; 8.6 MG/1; MG/1
1 TABLET, FILM COATED ORAL
COMMUNITY

## 2019-02-27 RX ORDER — AMLODIPINE BESYLATE 5 MG/1
5 TABLET ORAL DAILY
Status: DISCONTINUED | OUTPATIENT
Start: 2019-02-28 | End: 2019-03-03

## 2019-02-27 RX ORDER — METOPROLOL SUCCINATE 50 MG/1
50 TABLET, EXTENDED RELEASE ORAL DAILY
Status: DISCONTINUED | OUTPATIENT
Start: 2019-02-28 | End: 2019-03-03

## 2019-02-27 RX ORDER — ATORVASTATIN CALCIUM 10 MG/1
10 TABLET, FILM COATED ORAL DAILY
Status: DISCONTINUED | OUTPATIENT
Start: 2019-02-27 | End: 2019-03-04 | Stop reason: HOSPADM

## 2019-02-27 RX ORDER — ALBUTEROL SULFATE 2.5 MG/3ML
2.5 SOLUTION RESPIRATORY (INHALATION)
Status: DISCONTINUED | OUTPATIENT
Start: 2019-02-27 | End: 2019-02-27

## 2019-02-27 RX ORDER — TIZANIDINE 4 MG/1
2 TABLET ORAL NIGHTLY PRN
COMMUNITY

## 2019-02-27 RX ORDER — PANTOPRAZOLE SODIUM 40 MG/1
40 TABLET, DELAYED RELEASE ORAL DAILY
Status: DISCONTINUED | OUTPATIENT
Start: 2019-02-28 | End: 2019-03-04 | Stop reason: HOSPADM

## 2019-02-27 RX ORDER — ISOSORBIDE MONONITRATE 30 MG/1
30 TABLET, EXTENDED RELEASE ORAL DAILY
Status: DISCONTINUED | OUTPATIENT
Start: 2019-02-28 | End: 2019-03-01

## 2019-02-27 RX ORDER — SENNA AND DOCUSATE SODIUM 50; 8.6 MG/1; MG/1
1 TABLET, FILM COATED ORAL DAILY
Status: DISCONTINUED | OUTPATIENT
Start: 2019-02-28 | End: 2019-03-04 | Stop reason: HOSPADM

## 2019-02-27 RX ORDER — OSELTAMIVIR PHOSPHATE 30 MG/1
30 CAPSULE ORAL
Status: DISCONTINUED | OUTPATIENT
Start: 2019-02-27 | End: 2019-03-03

## 2019-02-27 RX ORDER — ALBUTEROL SULFATE 2.5 MG/3ML
2.5 SOLUTION RESPIRATORY (INHALATION)
Status: COMPLETED | OUTPATIENT
Start: 2019-02-27 | End: 2019-02-27

## 2019-02-27 RX ORDER — CEFTRIAXONE SODIUM 1 G/50ML
1 INJECTION, SOLUTION INTRAVENOUS EVERY 24 HOURS
Status: COMPLETED | OUTPATIENT
Start: 2019-02-28 | End: 2019-03-04

## 2019-02-27 RX ORDER — ASPIRIN 81 MG/1
81 TABLET, CHEWABLE ORAL DAILY
Status: DISCONTINUED | OUTPATIENT
Start: 2019-02-28 | End: 2019-03-04 | Stop reason: HOSPADM

## 2019-02-27 RX ORDER — IPRATROPIUM BROMIDE AND ALBUTEROL SULFATE 2.5; .5 MG/3ML; MG/3ML
3 SOLUTION RESPIRATORY (INHALATION)
Status: DISCONTINUED | OUTPATIENT
Start: 2019-02-27 | End: 2019-03-02

## 2019-02-27 RX ORDER — SODIUM CHLORIDE AND POTASSIUM CHLORIDE 150; 900 MG/100ML; MG/100ML
75 INJECTION, SOLUTION INTRAVENOUS CONTINUOUS
Status: DISCONTINUED | OUTPATIENT
Start: 2019-02-27 | End: 2019-02-28

## 2019-02-27 RX ADMIN — APIXABAN 2.5 MG: 2.5 TABLET, FILM COATED ORAL at 19:54

## 2019-02-27 RX ADMIN — IPRATROPIUM BROMIDE AND ALBUTEROL SULFATE 3 ML: 2.5; .5 SOLUTION RESPIRATORY (INHALATION) at 20:24

## 2019-02-27 RX ADMIN — GUAIFENESIN 600 MG: 600 TABLET, EXTENDED RELEASE ORAL at 19:55

## 2019-02-27 RX ADMIN — ATORVASTATIN CALCIUM 10 MG: 10 TABLET, FILM COATED ORAL at 19:55

## 2019-02-27 RX ADMIN — ALBUTEROL SULFATE 2.5 MG: 2.5 SOLUTION RESPIRATORY (INHALATION) at 10:50

## 2019-02-27 RX ADMIN — AZITHROMYCIN MONOHYDRATE 500 MG: 500 INJECTION, POWDER, LYOPHILIZED, FOR SOLUTION INTRAVENOUS at 12:59

## 2019-02-27 RX ADMIN — METHYLPREDNISOLONE SODIUM SUCCINATE 125 MG: 125 INJECTION, POWDER, FOR SOLUTION INTRAMUSCULAR; INTRAVENOUS at 10:46

## 2019-02-27 RX ADMIN — POTASSIUM CHLORIDE AND SODIUM CHLORIDE 75 ML/HR: 900; 150 INJECTION, SOLUTION INTRAVENOUS at 19:54

## 2019-02-27 RX ADMIN — SODIUM CHLORIDE, PRESERVATIVE FREE 10 ML: 5 INJECTION INTRAVENOUS at 19:55

## 2019-02-27 RX ADMIN — CEFTRIAXONE SODIUM 1 G: 1 INJECTION, SOLUTION INTRAVENOUS at 11:38

## 2019-02-27 RX ADMIN — ALBUTEROL SULFATE 2.5 MG: 2.5 SOLUTION RESPIRATORY (INHALATION) at 10:52

## 2019-02-28 LAB
ALBUMIN SERPL-MCNC: 3.5 G/DL (ref 3.5–5.2)
ALBUMIN/GLOB SERPL: 1.1 G/DL
ALP SERPL-CCNC: 69 U/L (ref 39–117)
ALT SERPL W P-5'-P-CCNC: 9 U/L (ref 1–33)
ANION GAP SERPL CALCULATED.3IONS-SCNC: 13.4 MMOL/L
AST SERPL-CCNC: 14 U/L (ref 1–32)
BASOPHILS # BLD AUTO: 0.02 10*3/MM3 (ref 0–0.2)
BASOPHILS NFR BLD AUTO: 0.3 % (ref 0–1.5)
BILIRUB SERPL-MCNC: 0.5 MG/DL (ref 0.1–1.2)
BUN BLD-MCNC: 21 MG/DL (ref 8–23)
BUN/CREAT SERPL: 29.6 (ref 7–25)
CALCIUM SPEC-SCNC: 9.7 MG/DL (ref 8.6–10.5)
CHLORIDE SERPL-SCNC: 104 MMOL/L (ref 98–107)
CHOLEST SERPL-MCNC: 119 MG/DL (ref 0–200)
CO2 SERPL-SCNC: 25.6 MMOL/L (ref 22–29)
CREAT BLD-MCNC: 0.71 MG/DL (ref 0.57–1)
DEPRECATED RDW RBC AUTO: 44.7 FL (ref 37–54)
EOSINOPHIL # BLD AUTO: 0 10*3/MM3 (ref 0–0.4)
EOSINOPHIL NFR BLD AUTO: 0 % (ref 0.3–6.2)
ERYTHROCYTE [DISTWIDTH] IN BLOOD BY AUTOMATED COUNT: 14.1 % (ref 12.3–15.4)
GFR SERPL CREATININE-BSD FRML MDRD: 78 ML/MIN/1.73
GLOBULIN UR ELPH-MCNC: 3.2 GM/DL
GLUCOSE BLD-MCNC: 146 MG/DL (ref 65–99)
HBA1C MFR BLD: 6 % (ref 4.8–5.6)
HCT VFR BLD AUTO: 38.6 % (ref 34–46.6)
HDLC SERPL-MCNC: 32 MG/DL (ref 40–60)
HGB BLD-MCNC: 12.1 G/DL (ref 12–15.9)
IMM GRANULOCYTES # BLD AUTO: 0.04 10*3/MM3 (ref 0–0.05)
IMM GRANULOCYTES NFR BLD AUTO: 0.6 % (ref 0–0.5)
LDLC SERPL CALC-MCNC: 73 MG/DL (ref 0–100)
LDLC/HDLC SERPL: 2.29 {RATIO}
LYMPHOCYTES # BLD AUTO: 0.87 10*3/MM3 (ref 0.7–3.1)
LYMPHOCYTES NFR BLD AUTO: 12.3 % (ref 19.6–45.3)
MCH RBC QN AUTO: 27.1 PG (ref 26.6–33)
MCHC RBC AUTO-ENTMCNC: 31.3 G/DL (ref 31.5–35.7)
MCV RBC AUTO: 86.4 FL (ref 79–97)
MONOCYTES # BLD AUTO: 0.44 10*3/MM3 (ref 0.1–0.9)
MONOCYTES NFR BLD AUTO: 6.2 % (ref 5–12)
NEUTROPHILS # BLD AUTO: 5.68 10*3/MM3 (ref 1.4–7)
NEUTROPHILS NFR BLD AUTO: 80.6 % (ref 42.7–76)
NRBC BLD AUTO-RTO: 0 /100 WBC (ref 0–0)
NT-PROBNP SERPL-MCNC: 3495 PG/ML (ref 0–1800)
PLATELET # BLD AUTO: 368 10*3/MM3 (ref 140–450)
PMV BLD AUTO: 9.5 FL (ref 6–12)
POTASSIUM BLD-SCNC: 3.7 MMOL/L (ref 3.5–5.2)
PROT SERPL-MCNC: 6.7 G/DL (ref 6–8.5)
RBC # BLD AUTO: 4.47 10*6/MM3 (ref 3.77–5.28)
SODIUM BLD-SCNC: 143 MMOL/L (ref 136–145)
TRIGL SERPL-MCNC: 68 MG/DL (ref 0–150)
TSH SERPL DL<=0.05 MIU/L-ACNC: 0.87 MIU/ML (ref 0.27–4.2)
VLDLC SERPL-MCNC: 13.6 MG/DL (ref 5–40)
WBC NRBC COR # BLD: 7.05 10*3/MM3 (ref 3.4–10.8)

## 2019-02-28 PROCEDURE — 94799 UNLISTED PULMONARY SVC/PX: CPT

## 2019-02-28 PROCEDURE — 96375 TX/PRO/DX INJ NEW DRUG ADDON: CPT

## 2019-02-28 PROCEDURE — 25010000002 LORAZEPAM PER 2 MG: Performed by: HOSPITALIST

## 2019-02-28 PROCEDURE — 80053 COMPREHEN METABOLIC PANEL: CPT | Performed by: HOSPITALIST

## 2019-02-28 PROCEDURE — G0378 HOSPITAL OBSERVATION PER HR: HCPCS

## 2019-02-28 PROCEDURE — 80061 LIPID PANEL: CPT | Performed by: HOSPITALIST

## 2019-02-28 PROCEDURE — 85025 COMPLETE CBC W/AUTO DIFF WBC: CPT | Performed by: HOSPITALIST

## 2019-02-28 PROCEDURE — 25010000002 AZITHROMYCIN PER 500 MG: Performed by: HOSPITALIST

## 2019-02-28 PROCEDURE — 84443 ASSAY THYROID STIM HORMONE: CPT | Performed by: HOSPITALIST

## 2019-02-28 PROCEDURE — 36415 COLL VENOUS BLD VENIPUNCTURE: CPT | Performed by: HOSPITALIST

## 2019-02-28 PROCEDURE — 83880 ASSAY OF NATRIURETIC PEPTIDE: CPT | Performed by: HOSPITALIST

## 2019-02-28 PROCEDURE — 83036 HEMOGLOBIN GLYCOSYLATED A1C: CPT | Performed by: HOSPITALIST

## 2019-02-28 PROCEDURE — 25010000002 CEFTRIAXONE PER 250 MG: Performed by: HOSPITALIST

## 2019-02-28 RX ORDER — LORAZEPAM 2 MG/ML
0.5 INJECTION INTRAMUSCULAR EVERY 4 HOURS PRN
Status: DISCONTINUED | OUTPATIENT
Start: 2019-02-28 | End: 2019-03-04

## 2019-02-28 RX ADMIN — AZITHROMYCIN MONOHYDRATE 500 MG: 500 INJECTION, POWDER, LYOPHILIZED, FOR SOLUTION INTRAVENOUS at 14:11

## 2019-02-28 RX ADMIN — APIXABAN 2.5 MG: 2.5 TABLET, FILM COATED ORAL at 08:29

## 2019-02-28 RX ADMIN — SODIUM CHLORIDE, PRESERVATIVE FREE 10 ML: 5 INJECTION INTRAVENOUS at 08:30

## 2019-02-28 RX ADMIN — PANTOPRAZOLE SODIUM 40 MG: 40 TABLET, DELAYED RELEASE ORAL at 08:30

## 2019-02-28 RX ADMIN — OSELTAMIVIR PHOSPHATE 30 MG: 30 CAPSULE ORAL at 08:30

## 2019-02-28 RX ADMIN — SENNOSIDES AND DOCUSATE SODIUM 1 TABLET: 8.6; 5 TABLET ORAL at 08:30

## 2019-02-28 RX ADMIN — LORAZEPAM 0.5 MG: 2 INJECTION INTRAMUSCULAR; INTRAVENOUS at 19:46

## 2019-02-28 RX ADMIN — METOPROLOL SUCCINATE 50 MG: 50 TABLET, FILM COATED, EXTENDED RELEASE ORAL at 08:30

## 2019-02-28 RX ADMIN — CEFTRIAXONE SODIUM 1 G: 1 INJECTION, SOLUTION INTRAVENOUS at 13:20

## 2019-02-28 RX ADMIN — ATORVASTATIN CALCIUM 10 MG: 10 TABLET, FILM COATED ORAL at 08:29

## 2019-02-28 RX ADMIN — IPRATROPIUM BROMIDE AND ALBUTEROL SULFATE 3 ML: 2.5; .5 SOLUTION RESPIRATORY (INHALATION) at 06:29

## 2019-02-28 RX ADMIN — AMLODIPINE BESYLATE 5 MG: 5 TABLET ORAL at 08:29

## 2019-02-28 RX ADMIN — IPRATROPIUM BROMIDE AND ALBUTEROL SULFATE 3 ML: 2.5; .5 SOLUTION RESPIRATORY (INHALATION) at 14:21

## 2019-02-28 RX ADMIN — IPRATROPIUM BROMIDE AND ALBUTEROL SULFATE 3 ML: 2.5; .5 SOLUTION RESPIRATORY (INHALATION) at 10:35

## 2019-02-28 RX ADMIN — ISOSORBIDE MONONITRATE 30 MG: 30 TABLET ORAL at 08:29

## 2019-02-28 RX ADMIN — GUAIFENESIN 600 MG: 600 TABLET, EXTENDED RELEASE ORAL at 08:29

## 2019-03-01 ENCOUNTER — APPOINTMENT (OUTPATIENT)
Dept: CARDIOLOGY | Facility: HOSPITAL | Age: 84
End: 2019-03-01

## 2019-03-01 LAB
ANION GAP SERPL CALCULATED.3IONS-SCNC: 14.5 MMOL/L
BASOPHILS # BLD AUTO: 0.03 10*3/MM3 (ref 0–0.2)
BASOPHILS NFR BLD AUTO: 0.3 % (ref 0–1.5)
BH CV ECHO MEAS - ACS: 1 CM
BH CV ECHO MEAS - AI DEC SLOPE: 332 CM/SEC^2
BH CV ECHO MEAS - AI MAX PG: 66 MMHG
BH CV ECHO MEAS - AI MAX VEL: 406 CM/SEC
BH CV ECHO MEAS - AI P1/2T: 358.2 MSEC
BH CV ECHO MEAS - AO MEAN PG (FULL): 7 MMHG
BH CV ECHO MEAS - AO MEAN PG: 8 MMHG
BH CV ECHO MEAS - AO ROOT AREA (BSA CORRECTED): 2.2
BH CV ECHO MEAS - AO ROOT AREA: 6.6 CM^2
BH CV ECHO MEAS - AO ROOT DIAM: 2.9 CM
BH CV ECHO MEAS - AO V2 MAX: 194 CM/SEC
BH CV ECHO MEAS - AO V2 MEAN: 127 CM/SEC
BH CV ECHO MEAS - AO V2 VTI: 41.5 CM
BH CV ECHO MEAS - AVA(I,A): 1.5 CM^2
BH CV ECHO MEAS - AVA(I,D): 1.5 CM^2
BH CV ECHO MEAS - BSA(HAYCOCK): 1.3 M^2
BH CV ECHO MEAS - BSA: 1.3 M^2
BH CV ECHO MEAS - BZI_BMI: 16.6 KILOGRAMS/M^2
BH CV ECHO MEAS - BZI_METRIC_HEIGHT: 152.4 CM
BH CV ECHO MEAS - BZI_METRIC_WEIGHT: 38.6 KG
BH CV ECHO MEAS - EDV(CUBED): 22 ML
BH CV ECHO MEAS - EDV(MOD-SP2): 45 ML
BH CV ECHO MEAS - EDV(MOD-SP4): 40 ML
BH CV ECHO MEAS - EDV(TEICH): 29.6 ML
BH CV ECHO MEAS - EF(CUBED): 68.8 %
BH CV ECHO MEAS - EF(MOD-BP): 55 %
BH CV ECHO MEAS - EF(MOD-SP2): 55.6 %
BH CV ECHO MEAS - EF(MOD-SP4): 55 %
BH CV ECHO MEAS - EF(TEICH): 62.2 %
BH CV ECHO MEAS - ESV(CUBED): 6.9 ML
BH CV ECHO MEAS - ESV(MOD-SP2): 20 ML
BH CV ECHO MEAS - ESV(MOD-SP4): 18 ML
BH CV ECHO MEAS - ESV(TEICH): 11.2 ML
BH CV ECHO MEAS - FS: 32.1 %
BH CV ECHO MEAS - IVS/LVPW: 1.1
BH CV ECHO MEAS - IVSD: 1 CM
BH CV ECHO MEAS - LAT PEAK E' VEL: 7 CM/SEC
BH CV ECHO MEAS - LV DIASTOLIC VOL/BSA (35-75): 30.8 ML/M^2
BH CV ECHO MEAS - LV MASS(C)D: 68.7 GRAMS
BH CV ECHO MEAS - LV MASS(C)DI: 53 GRAMS/M^2
BH CV ECHO MEAS - LV MEAN PG: 1 MMHG
BH CV ECHO MEAS - LV SYSTOLIC VOL/BSA (12-30): 13.9 ML/M^2
BH CV ECHO MEAS - LV V1 MAX: 89 CM/SEC
BH CV ECHO MEAS - LV V1 MEAN: 55.3 CM/SEC
BH CV ECHO MEAS - LV V1 VTI: 19.4 CM
BH CV ECHO MEAS - LVIDD: 2.8 CM
BH CV ECHO MEAS - LVIDS: 1.9 CM
BH CV ECHO MEAS - LVLD AP2: 5.9 CM
BH CV ECHO MEAS - LVLD AP4: 5.4 CM
BH CV ECHO MEAS - LVLS AP2: 5.1 CM
BH CV ECHO MEAS - LVLS AP4: 4.6 CM
BH CV ECHO MEAS - LVOT AREA (M): 3.1 CM^2
BH CV ECHO MEAS - LVOT AREA: 3.1 CM^2
BH CV ECHO MEAS - LVOT DIAM: 2 CM
BH CV ECHO MEAS - LVPWD: 0.9 CM
BH CV ECHO MEAS - MED PEAK E' VEL: 4 CM/SEC
BH CV ECHO MEAS - MR MAX PG: 107.3 MMHG
BH CV ECHO MEAS - MR MAX VEL: 518 CM/SEC
BH CV ECHO MEAS - MV DEC SLOPE: 645 CM/SEC^2
BH CV ECHO MEAS - MV DEC TIME: 222 SEC
BH CV ECHO MEAS - MV E MAX VEL: 152 CM/SEC
BH CV ECHO MEAS - MV MEAN PG: 3 MMHG
BH CV ECHO MEAS - MV P1/2T MAX VEL: 144 CM/SEC
BH CV ECHO MEAS - MV P1/2T: 65.4 MSEC
BH CV ECHO MEAS - MV V2 MEAN: 69.4 CM/SEC
BH CV ECHO MEAS - MV V2 VTI: 24.6 CM
BH CV ECHO MEAS - MVA P1/2T LCG: 1.5 CM^2
BH CV ECHO MEAS - MVA(P1/2T): 3.4 CM^2
BH CV ECHO MEAS - MVA(VTI): 2.5 CM^2
BH CV ECHO MEAS - PA ACC SLOPE: 14.1 CM/SEC^2
BH CV ECHO MEAS - PA ACC TIME: 0.12 SEC
BH CV ECHO MEAS - PA MAX PG (FULL): 1.9 MMHG
BH CV ECHO MEAS - PA MAX PG: 3.2 MMHG
BH CV ECHO MEAS - PA PR(ACCEL): 23.7 MMHG
BH CV ECHO MEAS - PA V2 MAX: 89.5 CM/SEC
BH CV ECHO MEAS - RAP SYSTOLE: 8 MMHG
BH CV ECHO MEAS - RV MAX PG: 1.3 MMHG
BH CV ECHO MEAS - RV MEAN PG: 1 MMHG
BH CV ECHO MEAS - RV V1 MAX: 57.9 CM/SEC
BH CV ECHO MEAS - RV V1 MEAN: 34.7 CM/SEC
BH CV ECHO MEAS - RV V1 VTI: 10.9 CM
BH CV ECHO MEAS - RVSP: 35.7 MMHG
BH CV ECHO MEAS - SI(AO): 211.3 ML/M^2
BH CV ECHO MEAS - SI(CUBED): 11.6 ML/M^2
BH CV ECHO MEAS - SI(LVOT): 47 ML/M^2
BH CV ECHO MEAS - SI(MOD-SP2): 19.3 ML/M^2
BH CV ECHO MEAS - SI(MOD-SP4): 17 ML/M^2
BH CV ECHO MEAS - SI(TEICH): 14.2 ML/M^2
BH CV ECHO MEAS - SV(AO): 274.1 ML
BH CV ECHO MEAS - SV(CUBED): 15.1 ML
BH CV ECHO MEAS - SV(LVOT): 60.9 ML
BH CV ECHO MEAS - SV(MOD-SP2): 25 ML
BH CV ECHO MEAS - SV(MOD-SP4): 22 ML
BH CV ECHO MEAS - SV(TEICH): 18.4 ML
BH CV ECHO MEAS - TAPSE (>1.6): 1.5 CM2
BH CV ECHO MEAS - TR MAX VEL: 263 CM/SEC
BH CV ECHO MEASUREMENTS AVERAGE E/E' RATIO: 27.64
BH CV VAS BP RIGHT ARM: NORMAL MMHG
BH CV XLRA - TDI S': 7 CM/SEC
BUN BLD-MCNC: 17 MG/DL (ref 8–23)
BUN/CREAT SERPL: 22.7 (ref 7–25)
CALCIUM SPEC-SCNC: 9.2 MG/DL (ref 8.6–10.5)
CHLORIDE SERPL-SCNC: 104 MMOL/L (ref 98–107)
CO2 SERPL-SCNC: 23.5 MMOL/L (ref 22–29)
CREAT BLD-MCNC: 0.75 MG/DL (ref 0.57–1)
DEPRECATED RDW RBC AUTO: 45.2 FL (ref 37–54)
EOSINOPHIL # BLD AUTO: 0.04 10*3/MM3 (ref 0–0.4)
EOSINOPHIL NFR BLD AUTO: 0.4 % (ref 0.3–6.2)
ERYTHROCYTE [DISTWIDTH] IN BLOOD BY AUTOMATED COUNT: 14.3 % (ref 12.3–15.4)
GFR SERPL CREATININE-BSD FRML MDRD: 73 ML/MIN/1.73
GLUCOSE BLD-MCNC: 102 MG/DL (ref 65–99)
HCT VFR BLD AUTO: 40.2 % (ref 34–46.6)
HGB BLD-MCNC: 12.5 G/DL (ref 12–15.9)
IMM GRANULOCYTES # BLD AUTO: 0.07 10*3/MM3 (ref 0–0.05)
IMM GRANULOCYTES NFR BLD AUTO: 0.7 % (ref 0–0.5)
LEFT ATRIUM VOLUME INDEX: 47 ML/M2
LV EF 2D ECHO EST: 55 %
LYMPHOCYTES # BLD AUTO: 1.42 10*3/MM3 (ref 0.7–3.1)
LYMPHOCYTES NFR BLD AUTO: 13.8 % (ref 19.6–45.3)
MAXIMAL PREDICTED HEART RATE: 131 BPM
MCH RBC QN AUTO: 27 PG (ref 26.6–33)
MCHC RBC AUTO-ENTMCNC: 31.1 G/DL (ref 31.5–35.7)
MCV RBC AUTO: 86.8 FL (ref 79–97)
MONOCYTES # BLD AUTO: 0.8 10*3/MM3 (ref 0.1–0.9)
MONOCYTES NFR BLD AUTO: 7.8 % (ref 5–12)
NEUTROPHILS # BLD AUTO: 7.9 10*3/MM3 (ref 1.4–7)
NEUTROPHILS NFR BLD AUTO: 77 % (ref 42.7–76)
NRBC BLD AUTO-RTO: 0 /100 WBC (ref 0–0)
NT-PROBNP SERPL-MCNC: 3614 PG/ML (ref 0–1800)
PLATELET # BLD AUTO: 495 10*3/MM3 (ref 140–450)
PMV BLD AUTO: 9.2 FL (ref 6–12)
POTASSIUM BLD-SCNC: 3.4 MMOL/L (ref 3.5–5.2)
RBC # BLD AUTO: 4.63 10*6/MM3 (ref 3.77–5.28)
SODIUM BLD-SCNC: 142 MMOL/L (ref 136–145)
STRESS TARGET HR: 111 BPM
WBC NRBC COR # BLD: 10.26 10*3/MM3 (ref 3.4–10.8)

## 2019-03-01 PROCEDURE — 94799 UNLISTED PULMONARY SVC/PX: CPT

## 2019-03-01 PROCEDURE — 25010000002 CEFTRIAXONE PER 250 MG: Performed by: HOSPITALIST

## 2019-03-01 PROCEDURE — G0378 HOSPITAL OBSERVATION PER HR: HCPCS

## 2019-03-01 PROCEDURE — 25010000002 AZITHROMYCIN PER 500 MG: Performed by: HOSPITALIST

## 2019-03-01 PROCEDURE — 80048 BASIC METABOLIC PNL TOTAL CA: CPT | Performed by: HOSPITALIST

## 2019-03-01 PROCEDURE — 93306 TTE W/DOPPLER COMPLETE: CPT

## 2019-03-01 PROCEDURE — 85025 COMPLETE CBC W/AUTO DIFF WBC: CPT | Performed by: HOSPITALIST

## 2019-03-01 PROCEDURE — 93306 TTE W/DOPPLER COMPLETE: CPT | Performed by: INTERNAL MEDICINE

## 2019-03-01 PROCEDURE — 83880 ASSAY OF NATRIURETIC PEPTIDE: CPT | Performed by: HOSPITALIST

## 2019-03-01 RX ORDER — POTASSIUM CHLORIDE 750 MG/1
40 CAPSULE, EXTENDED RELEASE ORAL ONCE
Status: COMPLETED | OUTPATIENT
Start: 2019-03-01 | End: 2019-03-01

## 2019-03-01 RX ORDER — POTASSIUM CHLORIDE 1.5 G/1.77G
40 POWDER, FOR SOLUTION ORAL ONCE
Status: DISCONTINUED | OUTPATIENT
Start: 2019-03-01 | End: 2019-03-01

## 2019-03-01 RX ADMIN — APIXABAN 2.5 MG: 2.5 TABLET, FILM COATED ORAL at 09:53

## 2019-03-01 RX ADMIN — ATORVASTATIN CALCIUM 10 MG: 10 TABLET, FILM COATED ORAL at 09:53

## 2019-03-01 RX ADMIN — METOPROLOL SUCCINATE 50 MG: 50 TABLET, FILM COATED, EXTENDED RELEASE ORAL at 09:53

## 2019-03-01 RX ADMIN — POTASSIUM CHLORIDE 40 MEQ: 750 CAPSULE, EXTENDED RELEASE ORAL at 16:51

## 2019-03-01 RX ADMIN — GUAIFENESIN 600 MG: 600 TABLET, EXTENDED RELEASE ORAL at 21:11

## 2019-03-01 RX ADMIN — ISOSORBIDE MONONITRATE 30 MG: 30 TABLET ORAL at 09:53

## 2019-03-01 RX ADMIN — CEFTRIAXONE SODIUM 1 G: 1 INJECTION, SOLUTION INTRAVENOUS at 10:09

## 2019-03-01 RX ADMIN — APIXABAN 2.5 MG: 2.5 TABLET, FILM COATED ORAL at 21:11

## 2019-03-01 RX ADMIN — IPRATROPIUM BROMIDE AND ALBUTEROL SULFATE 3 ML: 2.5; .5 SOLUTION RESPIRATORY (INHALATION) at 19:23

## 2019-03-01 RX ADMIN — OSELTAMIVIR PHOSPHATE 30 MG: 30 CAPSULE ORAL at 09:53

## 2019-03-01 RX ADMIN — ASPIRIN 81 MG: 81 TABLET, CHEWABLE ORAL at 09:53

## 2019-03-01 RX ADMIN — IPRATROPIUM BROMIDE AND ALBUTEROL SULFATE 3 ML: 2.5; .5 SOLUTION RESPIRATORY (INHALATION) at 10:41

## 2019-03-01 RX ADMIN — AMLODIPINE BESYLATE 5 MG: 5 TABLET ORAL at 09:53

## 2019-03-01 RX ADMIN — AZITHROMYCIN MONOHYDRATE 500 MG: 500 INJECTION, POWDER, LYOPHILIZED, FOR SOLUTION INTRAVENOUS at 12:36

## 2019-03-01 RX ADMIN — GUAIFENESIN 600 MG: 600 TABLET, EXTENDED RELEASE ORAL at 09:53

## 2019-03-01 RX ADMIN — PANTOPRAZOLE SODIUM 40 MG: 40 TABLET, DELAYED RELEASE ORAL at 09:53

## 2019-03-01 RX ADMIN — IPRATROPIUM BROMIDE AND ALBUTEROL SULFATE 3 ML: 2.5; .5 SOLUTION RESPIRATORY (INHALATION) at 15:22

## 2019-03-01 NOTE — PROGRESS NOTES
"Daily progress note    Chief complaint  Doing better   No specific complaints  Family at bedside    History of present illness  89-year-old white female with history of dementia chronic atrial fibrillation coronary artery disease valvular heart disease compression fracture hypertension hyperlipidemia TIA and stroke in the past who lives at home with caregiver and family support brought to the emergency room with dry cough shortness of breath for last 1 week.  Patient denies any fever but complained of chills also denies chest pain increased shortness of breath abdominal pain nausea vomiting diarrhea.  Patient evaluated in ER found to have bilateral pneumonia admitted for management.  She was recently diagnosed with influenza A and getting treatment for that.     REVIEW OF SYSTEMS  Review of Systems   Unable to perform ROS: Dementia      PHYSICAL EXAM  Blood pressure 104/71, pulse 109, temperature 97.5 °F (36.4 °C), temperature source Oral, resp. rate 18, height 152.4 cm (60\"), weight 38.6 kg (85 lb), SpO2 93 %.    Constitutional: She is well-developed, well-nourished, and in no distress.   Pt is frail appearing and pleasantly confused.    Head: Normocephalic.   Mouth/Throat: Mucous membranes are normal.   Eyes: No scleral icterus.   Neck: Normal range of motion.   Cardiovascular: Normal rate, regular rhythm and normal heart sounds.   Pulmonary/Chest: Effort normal. She has wheezes. She has rhonchi.   Abdominal: Soft. Bowel sounds are normal. There is no tenderness.   Musculoskeletal: Normal range of motion. She exhibits no edema ( to lower extremities).   Neurological: She is alert.   Skin: Skin is warm and dry.   Psychiatric: Mood and affect normal.     LAB RESULTS  Lab Results (last 24 hours)     Procedure Component Value Units Date/Time    Blood Culture - Blood, Arm, Left [[id number]] Collected:  02/27/19 1037    Specimen:  Blood from Arm, Left Updated:  03/01/19 1100     Blood Culture No growth at 2 days    Blood " Culture - Blood, Arm, Right [[id number]] Collected:  02/27/19 1037    Specimen:  Blood from Arm, Right Updated:  03/01/19 1100     Blood Culture No growth at 2 days    Basic Metabolic Panel [[id number]]  (Abnormal) Collected:  03/01/19 0859    Specimen:  Blood Updated:  03/01/19 0957     Glucose 102 mg/dL      BUN 17 mg/dL      Creatinine 0.75 mg/dL      Sodium 142 mmol/L      Potassium 3.4 mmol/L      Chloride 104 mmol/L      CO2 23.5 mmol/L      Calcium 9.2 mg/dL      eGFR Non African Amer 73 mL/min/1.73      BUN/Creatinine Ratio 22.7     Anion Gap 14.5 mmol/L     Narrative:       The MDRD GFR formula is only valid for adults with stable renal function between ages 18 and 70.    BNP [[id number]]  (Abnormal) Collected:  03/01/19 0859    Specimen:  Blood Updated:  03/01/19 0953     proBNP 3,614.0 pg/mL     Narrative:       Among patients with dyspnea, NT-proBNP is highly sensitive for the detection of acute congestive heart failure. In addition NT-proBNP of <300 pg/ml effectively rules out acute congestive heart failure with 99% negative predictive value.    CBC & Differential [[id number]] Collected:  03/01/19 0859    Specimen:  Blood Updated:  03/01/19 0928    Narrative:       The following orders were created for panel order CBC & Differential.  Procedure                               Abnormality         Status                     ---------                               -----------         ------                     CBC Auto Differential[[id number]]        Abnormal            Final result                 Please view results for these tests on the individual orders.    CBC Auto Differential [[id number]]  (Abnormal) Collected:  03/01/19 0859    Specimen:  Blood Updated:  03/01/19 0928     WBC 10.26 10*3/mm3      RBC 4.63 10*6/mm3      Hemoglobin 12.5 g/dL      Hematocrit 40.2 %      MCV 86.8 fL      MCH 27.0 pg      MCHC 31.1 g/dL      RDW 14.3 %      RDW-SD 45.2 fl      MPV 9.2 fL      Platelets 495 10*3/mm3       Neutrophil % 77.0 %      Lymphocyte % 13.8 %      Monocyte % 7.8 %      Eosinophil % 0.4 %      Basophil % 0.3 %      Immature Grans % 0.7 %      Neutrophils, Absolute 7.90 10*3/mm3      Lymphocytes, Absolute 1.42 10*3/mm3      Monocytes, Absolute 0.80 10*3/mm3      Eosinophils, Absolute 0.04 10*3/mm3      Basophils, Absolute 0.03 10*3/mm3      Immature Grans, Absolute 0.07 10*3/mm3      nRBC 0.0 /100 WBC         Imaging Results (last 24 hours)     ** No results found for the last 24 hours. **          Current Facility-Administered Medications:   •  amLODIPine (NORVASC) tablet 5 mg, 5 mg, Oral, Daily, Huseyin Ramesh MD, 5 mg at 03/01/19 0953  •  apixaban (ELIQUIS) tablet 2.5 mg, 2.5 mg, Oral, Q12H, Huseyin Ramesh MD, 2.5 mg at 03/01/19 0953  •  aspirin chewable tablet 81 mg, 81 mg, Oral, Daily, Huseyin Ramesh MD, 81 mg at 03/01/19 0953  •  atorvastatin (LIPITOR) tablet 10 mg, 10 mg, Oral, Daily, Huseyin Ramesh MD, 10 mg at 03/01/19 0953  •  azithromycin (ZITHROMAX) 500 mg 0.9% NaCl (Add-vantage) 250 mL, 500 mg, Intravenous, Q24H, Huseyin Ramesh MD, 500 mg at 03/01/19 1236  •  cefTRIAXone (ROCEPHIN) IVPB 1 g, 1 g, Intravenous, Q24H, Huseyin Ramesh MD, Last Rate: 100 mL/hr at 03/01/19 1009, 1 g at 03/01/19 1009  •  guaiFENesin (MUCINEX) 12 hr tablet 600 mg, 600 mg, Oral, Q12H, Huseyin Ramesh MD, 600 mg at 03/01/19 0953  •  ipratropium-albuterol (DUO-NEB) nebulizer solution 3 mL, 3 mL, Nebulization, Q4H - RT, Huseyin Ramesh MD, 3 mL at 03/01/19 1041  •  isosorbide mononitrate (IMDUR) 24 hr tablet 30 mg, 30 mg, Oral, Daily, Huseyin Ramesh MD, 30 mg at 03/01/19 0953  •  LORazepam (ATIVAN) injection 0.5 mg, 0.5 mg, Intravenous, Q4H PRN, Huseyin Ramesh MD, 0.5 mg at 02/28/19 1946  •  metoprolol succinate XL (TOPROL-XL) 24 hr tablet 50 mg, 50 mg, Oral, Daily, Huseyin Ramesh MD, 50 mg at 03/01/19 0953  •  oseltamivir (TAMIFLU) capsule 30 mg, 30 mg, Oral, Q24H, Huseyin Ramesh MD, 30 mg at 03/01/19 0953  •  pantoprazole (PROTONIX) EC  tablet 40 mg, 40 mg, Oral, Daily, Sumit Ramesh MD, 40 mg at 03/01/19 0953  •  sennosides-docusate sodium (SENOKOT-S) 8.6-50 MG tablet 1 tablet, 1 tablet, Oral, Daily, Sumit Ramesh MD, 1 tablet at 02/28/19 0830  •  [COMPLETED] Insert peripheral IV, , , Once **AND** sodium chloride 0.9 % flush 10 mL, 10 mL, Intravenous, PRN, Marianela Draper, APRN, 10 mL at 02/28/19 0830  •  tiZANidine (ZANAFLEX) tablet 2 mg, 2 mg, Oral, Nightly PRN, Sumit Ramesh MD     ASSESSMENT  Bibasilar pneumonia community-acquired infectious  Recent influenza A  Dehydration resolved  Coronary artery disease  Chronic atrial fibrillation on Eliquis  Hypertension  Hyperlipidemia  Large hiatal hernia  Dementia  Gastroesophageal reflux disease    PLAN  CPM  IV antibiotics   Tamiflu for 5 days  Continue home medication and adjust the doses  Stress ulcer DVT prophylaxis  Discussed with family  Patient is DNR and does not want feeding tube dialysis either  Follow closely further recommendation going to hospital course    SUMIT RAMESH MD

## 2019-03-01 NOTE — PLAN OF CARE
Problem: Fall Risk (Adult)  Goal: Absence of Fall  Outcome: Ongoing (interventions implemented as appropriate)      Problem: Patient Care Overview  Goal: Plan of Care Review  Outcome: Ongoing (interventions implemented as appropriate)   03/01/19 0430   Coping/Psychosocial   Plan of Care Reviewed With patient   Plan of Care Review   Progress no change   OTHER   Outcome Summary Pt slept well after Ativan at beginning of shift. Sister at BS. Very confused and agitated before Ativan. Bed alarm in use.       Problem: Skin Injury Risk (Adult)  Goal: Skin Health and Integrity  Outcome: Ongoing (interventions implemented as appropriate)

## 2019-03-01 NOTE — PLAN OF CARE
Problem: Fall Risk (Adult)  Goal: Absence of Fall  Outcome: Ongoing (interventions implemented as appropriate)      Problem: Patient Care Overview  Goal: Plan of Care Review  Outcome: Ongoing (interventions implemented as appropriate)   03/01/19 1822   Coping/Psychosocial   Plan of Care Reviewed With patient   Plan of Care Review   Progress no change   OTHER   Outcome Summary Confused. Uncooperative and agitated at times. IV abx cont. Off unit for echo at present. Cont to monitor.       Problem: Skin Injury Risk (Adult)  Goal: Skin Health and Integrity  Outcome: Ongoing (interventions implemented as appropriate)      Problem: Nutrition, Imbalanced: Inadequate Oral Intake (Adult)  Goal: Identify Related Risk Factors and Signs and Symptoms  Outcome: Outcome(s) achieved Date Met: 03/01/19    Goal: Improved Oral Intake  Outcome: Ongoing (interventions implemented as appropriate)    Goal: Prevent Further Weight Loss  Outcome: Ongoing (interventions implemented as appropriate)

## 2019-03-01 NOTE — PLAN OF CARE
Problem: Patient Care Overview  Goal: Plan of Care Review  Outcome: Ongoing (interventions implemented as appropriate)   02/28/19 2039   Coping/Psychosocial   Plan of Care Reviewed With patient;caregiver;sibling   OTHER   Outcome Summary Denies SOA. Pt. wheezing and rhonchi lung sounds. IV potassium discontinued. Pt family and caregiver at bedside, cooperative and pleasant during the day. Pt started on Abx. IV site changed. Pt became uncooperative, combative, after early evening demonstrating dementia. Extreme anger and cussing, trying to get out of bed. Dr. Ramesh called and prescribed Ativan at end of shift. VSS stable, No pain, no N/V/D. Will continue to monitor.       Problem: Nutrition, Imbalanced: Inadequate Oral Intake (Adult)  Goal: Identify Related Risk Factors and Signs and Symptoms  Outcome: Ongoing (interventions implemented as appropriate)    Goal: Improved Oral Intake  Outcome: Ongoing (interventions implemented as appropriate)    Goal: Prevent Further Weight Loss  Outcome: Ongoing (interventions implemented as appropriate)

## 2019-03-01 NOTE — PLAN OF CARE
Problem: Patient Care Overview  Goal: Plan of Care Review  Outcome: Ongoing (interventions implemented as appropriate)   03/01/19 1224   Coping/Psychosocial   Plan of Care Reviewed With patient   OTHER   Outcome Summary Patient remains on RA. Patient receiving breathing treatments Q4. Encouraged patient to cough and deep breathe.

## 2019-03-02 ENCOUNTER — APPOINTMENT (OUTPATIENT)
Dept: GENERAL RADIOLOGY | Facility: HOSPITAL | Age: 84
End: 2019-03-02

## 2019-03-02 LAB
ANION GAP SERPL CALCULATED.3IONS-SCNC: 11.3 MMOL/L
BASOPHILS # BLD AUTO: 0.03 10*3/MM3 (ref 0–0.2)
BASOPHILS NFR BLD AUTO: 0.4 % (ref 0–1.5)
BUN BLD-MCNC: 14 MG/DL (ref 8–23)
BUN/CREAT SERPL: 20 (ref 7–25)
CALCIUM SPEC-SCNC: 9.3 MG/DL (ref 8.6–10.5)
CHLORIDE SERPL-SCNC: 108 MMOL/L (ref 98–107)
CO2 SERPL-SCNC: 25.7 MMOL/L (ref 22–29)
CREAT BLD-MCNC: 0.7 MG/DL (ref 0.57–1)
DEPRECATED RDW RBC AUTO: 45.6 FL (ref 37–54)
EOSINOPHIL # BLD AUTO: 0.13 10*3/MM3 (ref 0–0.4)
EOSINOPHIL NFR BLD AUTO: 1.8 % (ref 0.3–6.2)
ERYTHROCYTE [DISTWIDTH] IN BLOOD BY AUTOMATED COUNT: 14.2 % (ref 12.3–15.4)
GFR SERPL CREATININE-BSD FRML MDRD: 79 ML/MIN/1.73
GLUCOSE BLD-MCNC: 89 MG/DL (ref 65–99)
HCT VFR BLD AUTO: 38.6 % (ref 34–46.6)
HGB BLD-MCNC: 12.1 G/DL (ref 12–15.9)
IMM GRANULOCYTES # BLD AUTO: 0.05 10*3/MM3 (ref 0–0.05)
IMM GRANULOCYTES NFR BLD AUTO: 0.7 % (ref 0–0.5)
LYMPHOCYTES # BLD AUTO: 1.27 10*3/MM3 (ref 0.7–3.1)
LYMPHOCYTES NFR BLD AUTO: 17.6 % (ref 19.6–45.3)
MCH RBC QN AUTO: 27.3 PG (ref 26.6–33)
MCHC RBC AUTO-ENTMCNC: 31.3 G/DL (ref 31.5–35.7)
MCV RBC AUTO: 86.9 FL (ref 79–97)
MONOCYTES # BLD AUTO: 0.58 10*3/MM3 (ref 0.1–0.9)
MONOCYTES NFR BLD AUTO: 8 % (ref 5–12)
NEUTROPHILS # BLD AUTO: 5.15 10*3/MM3 (ref 1.4–7)
NEUTROPHILS NFR BLD AUTO: 71.5 % (ref 42.7–76)
NRBC BLD AUTO-RTO: 0 /100 WBC (ref 0–0)
PLATELET # BLD AUTO: 480 10*3/MM3 (ref 140–450)
PMV BLD AUTO: 9.2 FL (ref 6–12)
POTASSIUM BLD-SCNC: 3.9 MMOL/L (ref 3.5–5.2)
RBC # BLD AUTO: 4.44 10*6/MM3 (ref 3.77–5.28)
SODIUM BLD-SCNC: 145 MMOL/L (ref 136–145)
WBC NRBC COR # BLD: 7.21 10*3/MM3 (ref 3.4–10.8)

## 2019-03-02 PROCEDURE — 25010000002 AZITHROMYCIN PER 500 MG: Performed by: HOSPITALIST

## 2019-03-02 PROCEDURE — 80048 BASIC METABOLIC PNL TOTAL CA: CPT | Performed by: HOSPITALIST

## 2019-03-02 PROCEDURE — 93010 ELECTROCARDIOGRAM REPORT: CPT | Performed by: INTERNAL MEDICINE

## 2019-03-02 PROCEDURE — 25010000002 CEFTRIAXONE PER 250 MG: Performed by: HOSPITALIST

## 2019-03-02 PROCEDURE — 93005 ELECTROCARDIOGRAM TRACING: CPT | Performed by: HOSPITALIST

## 2019-03-02 PROCEDURE — 97161 PT EVAL LOW COMPLEX 20 MIN: CPT

## 2019-03-02 PROCEDURE — 94799 UNLISTED PULMONARY SVC/PX: CPT

## 2019-03-02 PROCEDURE — 85025 COMPLETE CBC W/AUTO DIFF WBC: CPT | Performed by: HOSPITALIST

## 2019-03-02 PROCEDURE — 96366 THER/PROPH/DIAG IV INF ADDON: CPT

## 2019-03-02 PROCEDURE — 96367 TX/PROPH/DG ADDL SEQ IV INF: CPT

## 2019-03-02 PROCEDURE — 97166 OT EVAL MOD COMPLEX 45 MIN: CPT

## 2019-03-02 PROCEDURE — 71045 X-RAY EXAM CHEST 1 VIEW: CPT

## 2019-03-02 PROCEDURE — G0378 HOSPITAL OBSERVATION PER HR: HCPCS

## 2019-03-02 RX ORDER — IPRATROPIUM BROMIDE AND ALBUTEROL SULFATE 2.5; .5 MG/3ML; MG/3ML
3 SOLUTION RESPIRATORY (INHALATION) EVERY 4 HOURS PRN
Status: DISCONTINUED | OUTPATIENT
Start: 2019-03-02 | End: 2019-03-04

## 2019-03-02 RX ADMIN — OSELTAMIVIR PHOSPHATE 30 MG: 30 CAPSULE ORAL at 09:26

## 2019-03-02 RX ADMIN — GUAIFENESIN 600 MG: 600 TABLET, EXTENDED RELEASE ORAL at 20:29

## 2019-03-02 RX ADMIN — ASPIRIN 81 MG: 81 TABLET, CHEWABLE ORAL at 09:26

## 2019-03-02 RX ADMIN — AMLODIPINE BESYLATE 5 MG: 5 TABLET ORAL at 09:26

## 2019-03-02 RX ADMIN — CEFTRIAXONE SODIUM 1 G: 1 INJECTION, SOLUTION INTRAVENOUS at 11:52

## 2019-03-02 RX ADMIN — METOPROLOL SUCCINATE 50 MG: 50 TABLET, FILM COATED, EXTENDED RELEASE ORAL at 09:26

## 2019-03-02 RX ADMIN — ATORVASTATIN CALCIUM 10 MG: 10 TABLET, FILM COATED ORAL at 09:26

## 2019-03-02 RX ADMIN — AZITHROMYCIN MONOHYDRATE 500 MG: 500 INJECTION, POWDER, LYOPHILIZED, FOR SOLUTION INTRAVENOUS at 12:47

## 2019-03-02 RX ADMIN — APIXABAN 2.5 MG: 2.5 TABLET, FILM COATED ORAL at 09:26

## 2019-03-02 RX ADMIN — IPRATROPIUM BROMIDE AND ALBUTEROL SULFATE 3 ML: 2.5; .5 SOLUTION RESPIRATORY (INHALATION) at 11:36

## 2019-03-02 RX ADMIN — APIXABAN 2.5 MG: 2.5 TABLET, FILM COATED ORAL at 20:29

## 2019-03-02 RX ADMIN — GUAIFENESIN 600 MG: 600 TABLET, EXTENDED RELEASE ORAL at 09:26

## 2019-03-02 RX ADMIN — IPRATROPIUM BROMIDE AND ALBUTEROL SULFATE 3 ML: 2.5; .5 SOLUTION RESPIRATORY (INHALATION) at 07:58

## 2019-03-02 RX ADMIN — PANTOPRAZOLE SODIUM 40 MG: 40 TABLET, DELAYED RELEASE ORAL at 09:26

## 2019-03-02 NOTE — PLAN OF CARE
Problem: Patient Care Overview  Goal: Plan of Care Review  Outcome: Ongoing (interventions implemented as appropriate)   03/02/19 9636   Coping/Psychosocial   Plan of Care Reviewed With patient   OTHER   Outcome Summary Patient remained confuse,reoriented to time and situation with sibling at the bedside. Patient denies any pain,not in any distress. . Initially refused to take night meds and gets angry to staff easily (shouts at staff). Patient when awake keep saying she wants to go home.Fall precautions enforced.Vital signs as recorded. Monitored.     Goal: Discharge Needs Assessment  Outcome: Ongoing (interventions implemented as appropriate)      Problem: Skin Injury Risk (Adult)  Goal: Skin Health and Integrity  Outcome: Ongoing (interventions implemented as appropriate)      Problem: Nutrition, Imbalanced: Inadequate Oral Intake (Adult)  Goal: Improved Oral Intake  Outcome: Ongoing (interventions implemented as appropriate)

## 2019-03-02 NOTE — THERAPY EVALUATION
Acute Care - Physical Therapy Initial Evaluation  [facility]     Patient Name: Lacy Valerio  : 1929  MRN: [id number]  Today's Date: 3/2/2019   Onset of Illness/Injury or Date of Surgery: 19  Date of Referral to PT: 19  Referring Physician: Gadiel      Admit Date: 2019    Visit Dx:     ICD-10-CM ICD-9-CM   1. Community acquired pneumonia, unspecified laterality J18.9 486   2. Influenza A J10.1 487.1   3. Impaired functional mobility and endurance Z74.09 V49.89     Patient Active Problem List   Diagnosis   • Closed fracture of neck of right femur (CMS/HCC)   • Brianna-prosthetic fracture around prosthetic hip   • Community acquired pneumonia     Past Medical History:   Diagnosis Date   • Aortic valve regurgitation    • Atrial fibrillation (CMS/HCC)    • CAD (coronary artery disease)    • Compression fracture of thoracic vertebra (CMS/HCC)    • Hyperlipidemia    • Hypertension    • MI (myocardial infarction) (CMS/HCC)    • Stroke (CMS/[facility])    • TIA (transient ischemic attack)      Past Surgical History:   Procedure Laterality Date   • CARDIAC CATHETERIZATION     • CARDIAC SURGERY     • CAROTID STENT     • CORONARY ARTERY BYPASS GRAFT     • HIP ENDOPROSTHESIS Right 2018    Procedure: RT. HIP ENDOPROSTHESIS ANTERIOR;  Surgeon: Tyler Tolliver MD;  Location: [facility];  Service:    • HYSTERECTOMY     • JOINT REPLACEMENT Bilateral     hip   • PARTIAL HIP ARTHROPLASTY Left         PT ASSESSMENT (last 12 hours)      Physical Therapy Evaluation     Row Name 19 1140          PT Evaluation Time/Intention    Subjective Information  complains of;fatigue  -SV     Document Type  evaluation  -SV     Mode of Treatment  individual therapy  -SV     Patient Effort  adequate  -SV     Symptoms Noted During/After Treatment  shortness of breath  -SV     Comment  Pt agitated, refused initially then agreed only to amb and back to bed  -SV     Row Name 19 1140          General Information     Patient Profile Reviewed?  yes  -SV     Onset of Illness/Injury or Date of Surgery  02/27/19  -SV     Referring Physician  Ahmed  -SV     Patient Observations  alert;agree to therapy;poorly cooperative  -SV     Patient/Family Observations  several present  -SV     General Observations of Patient  NAD, monitors  -SV     Prior Level of Function  -- likely indep amb with rwx but pt not very conversive: [place]  -SV     Equipment Currently Used at Home  walker, rolling  -SV     Pertinent History of Current Functional Problem  pt admit after onset of Influenza A. Now with merle PNA, dehydration, pmh dementia, fall with hx fx and endoprosthesis. s/p periprosthetic fx 4/9/2018  -SV     Row Name 03/02/19 1140          Relationship/Environment    Primary Source of Support/Comfort  child(jacob)  -SV     Row Name 03/02/19 1140          Resource/Environmental Concerns    Current Living Arrangements  home/apartment/condo  -SV     Row Name 03/02/19 1140          Bed Mobility Assessment/Treatment    Bed Mobility Assessment/Treatment  supine-sit;sit-supine  -SV     Supine-Sit [place] (Bed Mobility)  verbal cues;contact guard;minimum assist (75% patient effort)  -SV     Sit-Supine [place] (Bed Mobility)  contact guard refused asst sit to sup  -SV     Assistive Device (Bed Mobility)  bed rails  -SV     Comment (Bed Mobility)  labored  -SV     Row Name 03/02/19 1140          Transfer Assessment/Treatment    Transfer Assessment/Treatment  sit-stand transfer;stand-sit transfer  -SV     Sit-Stand [place] (Transfers)  contact guard  -SV     Stand-Sit [place] (Transfers)  contact guard  -SV     Row Name 03/02/19 1140          Sit-Stand Transfer    Assistive Device (Sit-Stand Transfers)  walker, front-wheeled  -SV     Row Name 03/02/19 1140          Stand-Sit Transfer    Assistive Device (Stand-Sit Transfers)  walker, front-wheeled  -SV     Row Name 03/02/19 1140          Gait/Stairs Assessment/Training    [place] Level  (Gait)  contact guard  -SV     Assistive Device (Gait)  walker, front-wheeled  -SV     Distance in Feet (Gait)  100 shuffle gait, mild soa  -SV     Row Name 03/02/19 1140          General ROM    GENERAL ROM COMMENTS  grossly observed wfl arom BUE/BLE with mobility  -SV     Row Name 03/02/19 1140          MMT (Manual Muscle Testing)    General MMT Comments  BUE/BLE appear >3/5 grossly obs with mobility  -SV     Row Name 03/02/19 1140          Pain Assessment    Additional Documentation  -- no signs  -SV     Row Name 03/02/19 1140          Physical Therapy Clinical Impression    Date of Referral to PT  03/01/19  -SV     Functional Level at Time of Evaluation (PT Clinical Impression)  cga for transfers and amb 100' with rwx  -SV     Patient/Family Goals Statement (PT Clinical Impression)  indep amb  -SV     Criteria for Skilled Interventions Met (PT Clinical Impression)  treatment indicated  -SV     Pathology/Pathophysiology Noted (Describe Specifically for Each System)  musculoskeletal;pulmonary  -SV     Impairments Found (describe specific impairments)  aerobic capacity/endurance;gait, locomotion, and balance  -SV     Functional Limitations in Following Categories (Describe Specific Limitations)  self-care;home management;community/leisure  -SV     Rehab Potential (PT Clinical Summary)  good, to achieve stated therapy goals  -SV     Predicted Duration of Therapy (PT)  1-2weeks  -SV     Row Name 03/02/19 1140          Vital Signs    O2 Delivery Pre Treatment  room air  -SV     Intra SpO2 (%)  93  -SV     O2 Delivery Intra Treatment  room air  -SV     Post SpO2 (%)  95  -SV     O2 Delivery Post Treatment  room air  -SV     Pre Patient Position  Supine  -SV     Intra Patient Position  Standing  -SV     Post Patient Position  Supine  -SV     Row Name 03/02/19 1140          Physical Therapy Goals    Bed Mobility Goal Selection (PT)  bed mobility, PT goal 1  -SV     Transfer Goal Selection (PT)  transfer, PT goal 1  -SV      Gait Training Goal Selection (PT)  gait training, PT goal 1  -SV     Row Name 03/02/19 1140          Bed Mobility Goal 1 (PT)    Activity/Assistive Device (Bed Mobility Goal 1, PT)  sit to supine/supine to sit  -SV     [place] Level/Cues Needed (Bed Mobility Goal 1, PT)  independent;supervision required  -SV     Time Frame (Bed Mobility Goal 1, PT)  10 days  -SV     Barriers (Bed Mobility Goal 1, PT)  dementia  -SV     Row Name 03/02/19 1140          Transfer Goal 1 (PT)    Activity/Assistive Device (Transfer Goal 1, PT)  sit-to-stand/stand-to-sit;walker, rolling  -SV     [place] Level/Cues Needed (Transfer Goal 1, PT)  independent;supervision required  -SV     Time Frame (Transfer Goal 1, PT)  10 days  -SV     Row Name 03/02/19 1140          Gait Training Goal 1 (PT)    Activity/Assistive Device (Gait Training Goal 1, PT)  gait (walking locomotion);walker, rolling  -SV     [place] Level (Gait Training Goal 1, PT)  independent;supervision required  -SV     Distance (Gait Goal 1, PT)  200  -SV     Time Frame (Gait Training Goal 1, PT)  10 days  -SV     Barriers (Gait Training Goal 1, PT)  dementia  -SV     Row Name 03/02/19 1140          Positioning and Restraints    Pre-Treatment Position  in bed  -SV     Post Treatment Position  bed  -SV     In Bed  notified nsg;fowlers;call light within reach;encouraged to call for assist;exit alarm on;with family/caregiver  -SV       User Key  (r) = Recorded By, (t) = Taken By, (c) = Cosigned By    Initials Name Provider Type    SV Jyotsna Suarez, PT Physical Therapist          PT Recommendation and Plan  Anticipated Discharge Disposition (PT): home with assist  Planned Therapy Interventions (PT Eval): balance training, bed mobility training, gait training, home exercise program, patient/family education, stair training, strengthening, stretching, transfer training  Therapy Frequency (PT Clinical Impression): daily  Outcome Summary/Treatment Plan  (PT)  Anticipated Discharge Disposition (PT): home with assist  Plan of Care Reviewed With: patient, family  Outcome Summary: Pt is an 89 y.o.f.admit with PNA due to influenza A. Pt is agitated this morning and initially refused. Pt agreed to short distance amb only with return to supine. Pt difficult to evaluate rom/strength due to agitation. Pt able to amb 100' with rwx and cga. Expect home with caregivers at d/c. PT will attempt to see daily for progression with amb as able.   Outcome Measures     Row Name 03/02/19 1300             How much help from another person do you currently need...    Turning from your back to your side while in flat bed without using bedrails?  4  -SV      Moving from lying on back to sitting on the side of a flat bed without bedrails?  3  -SV      Moving to and from a bed to a chair (including a wheelchair)?  3  -SV      Standing up from a chair using your arms (e.g., wheelchair, bedside chair)?  3  -SV      Climbing 3-5 steps with a railing?  3  -SV      To walk in hospital room?  3  -SV      AM-PAC 6 Clicks Score  19  -SV        User Key  (r) = Recorded By, (t) = Taken By, (c) = Cosigned By    Initials Name Provider Type    SV Jyotsna Suarez, PT Physical Therapist         Time Calculation:   PT Charges     Row Name 03/02/19 1327             Time Calculation    Start Time  1140  -SV      Stop Time  1155  -SV      Time Calculation (min)  15 min  -SV      PT Received On  03/02/19  -SV      PT - Next Appointment  03/03/19  -SV      PT Goal Re-Cert Due Date  03/12/19  -SV        User Key  (r) = Recorded By, (t) = Taken By, (c) = Cosigned By    Initials Name Provider Type    SV Jyotsna Suarez, PT Physical Therapist        Therapy Suggested Charges     Code   Minutes Charges    None           Therapy Charges for Today     Code Description Service Date Service Provider Modifiers Qty    93314461956 HC PT EVAL LOW COMPLEXITY 1 3/2/2019 Jyotsna Suarez, PT GP 1    35634846546 HC PT THER  SUPP EA 15 MIN 3/2/2019 Jyotsna Suarez, PT GP 1          PT G-Codes  AM-PAC 6 Clicks Score: 19      Jyotsna Suarez, PT  3/2/2019

## 2019-03-02 NOTE — PROGRESS NOTES
"Daily progress note    Chief complaint  Doing better   No specific complaints  Family at bedside    History of present illness  89-year-old white female with history of dementia chronic atrial fibrillation coronary artery disease valvular heart disease compression fracture hypertension hyperlipidemia TIA and stroke in the past who lives at home with caregiver and family support brought to the emergency room with dry cough shortness of breath for last 1 week.  Patient denies any fever but complained of chills also denies chest pain increased shortness of breath abdominal pain nausea vomiting diarrhea.  Patient evaluated in ER found to have bilateral pneumonia admitted for management.  She was recently diagnosed with influenza A and getting treatment for that.     REVIEW OF SYSTEMS  Review of Systems   Unable to perform ROS: Dementia      PHYSICAL EXAM  Blood pressure 126/60, pulse 76, temperature 97.5 °F (36.4 °C), temperature source Oral, resp. rate 16, height 152.4 cm (60\"), weight 38.6 kg (85 lb), SpO2 92 %.    Constitutional: She is well-developed, well-nourished, and in no distress.   Pt is frail appearing and pleasantly confused.    Head: Normocephalic.   Mouth/Throat: Mucous membranes are normal.   Eyes: No scleral icterus.   Neck: Normal range of motion.   Cardiovascular: Normal rate, regular rhythm and normal heart sounds.   Pulmonary/Chest: Effort normal. She has wheezes. She has rhonchi.   Abdominal: Soft. Bowel sounds are normal. There is no tenderness.   Musculoskeletal: Normal range of motion. She exhibits no edema ( to lower extremities).   Neurological: She is alert.   Skin: Skin is warm and dry.   Psychiatric: Mood and affect normal.     LAB RESULTS  Lab Results (last 24 hours)     Procedure Component Value Units Date/Time    Blood Culture - Blood, Arm, Right [[id number]] Collected:  02/27/19 1037    Specimen:  Blood from Arm, Right Updated:  03/02/19 1100     Blood Culture No growth at 3 days    " Blood Culture - Blood, Arm, Left [[id number]] Collected:  02/27/19 1037    Specimen:  Blood from Arm, Left Updated:  03/02/19 1100     Blood Culture No growth at 3 days    Basic Metabolic Panel [[id number]]  (Abnormal) Collected:  03/02/19 0559    Specimen:  Blood Updated:  03/02/19 0744     Glucose 89 mg/dL      BUN 14 mg/dL      Creatinine 0.70 mg/dL      Sodium 145 mmol/L      Potassium 3.9 mmol/L      Chloride 108 mmol/L      CO2 25.7 mmol/L      Calcium 9.3 mg/dL      eGFR Non African Amer 79 mL/min/1.73      BUN/Creatinine Ratio 20.0     Anion Gap 11.3 mmol/L     Narrative:       The MDRD GFR formula is only valid for adults with stable renal function between ages 18 and 70.    CBC & Differential [[id number]] Collected:  03/02/19 0559    Specimen:  Blood Updated:  03/02/19 0735    Narrative:       The following orders were created for panel order CBC & Differential.  Procedure                               Abnormality         Status                     ---------                               -----------         ------                     CBC Auto Differential[[id number]]        Abnormal            Final result                 Please view results for these tests on the individual orders.    CBC Auto Differential [[id number]]  (Abnormal) Collected:  03/02/19 0559    Specimen:  Blood Updated:  03/02/19 0735     WBC 7.21 10*3/mm3      RBC 4.44 10*6/mm3      Hemoglobin 12.1 g/dL      Hematocrit 38.6 %      MCV 86.9 fL      MCH 27.3 pg      MCHC 31.3 g/dL      RDW 14.2 %      RDW-SD 45.6 fl      MPV 9.2 fL      Platelets 480 10*3/mm3      Neutrophil % 71.5 %      Lymphocyte % 17.6 %      Monocyte % 8.0 %      Eosinophil % 1.8 %      Basophil % 0.4 %      Immature Grans % 0.7 %      Neutrophils, Absolute 5.15 10*3/mm3      Lymphocytes, Absolute 1.27 10*3/mm3      Monocytes, Absolute 0.58 10*3/mm3      Eosinophils, Absolute 0.13 10*3/mm3      Basophils, Absolute 0.03 10*3/mm3      Immature Grans, Absolute 0.05 10*3/mm3       nRBC 0.0 /100 WBC         Imaging Results (last 24 hours)     Procedure Component Value Units Date/Time    XR Chest 1 View [[id number]] Collected:  03/02/19 0617     Updated:  03/02/19 0628    Narrative:       XR CHEST 1 VW-     HISTORY: Female who is 89 years-old,  pneumonia     TECHNIQUE: Frontal view of the chest     COMPARISON: 2/27/2019     FINDINGS: The heart appears mildly enlarged. Aorta is tortuous.  Sternotomy wires are seen. Large hiatal hernia/intrathoracic stomach is  apparent. Previously demonstrated posterior basilar infiltrate or  atelectasis, on the lateral view of the prior exam, is not well  characterized on this exam, follow-up exam with a lateral view could be  obtained. No large pleural effusion or pneumothorax. Otherwise stable.          Impression:       No obvious interval change, as described. Follow-up/further evaluation  can be obtained as indicated.     This report was finalized on 3/2/2019 6:25 AM by Dr. Hernando Horton M.D.             Current Facility-Administered Medications:   •  amLODIPine (NORVASC) tablet 5 mg, 5 mg, Oral, Daily, Huseyin Ramesh MD, 5 mg at 03/02/19 0926  •  apixaban (ELIQUIS) tablet 2.5 mg, 2.5 mg, Oral, Q12H, Huseyin Ramesh MD, 2.5 mg at 03/02/19 0926  •  aspirin chewable tablet 81 mg, 81 mg, Oral, Daily, Huseyin Ramesh MD, 81 mg at 03/02/19 0926  •  atorvastatin (LIPITOR) tablet 10 mg, 10 mg, Oral, Daily, Huseyin Ramesh MD, 10 mg at 03/02/19 0926  •  azithromycin (ZITHROMAX) 500 mg 0.9% NaCl (Add-vantage) 250 mL, 500 mg, Intravenous, Q24H, Huseyin Ramesh MD, 500 mg at 03/02/19 1247  •  cefTRIAXone (ROCEPHIN) IVPB 1 g, 1 g, Intravenous, Q24H, Huseyin Ramesh MD, Last Rate: 100 mL/hr at 03/02/19 1152, 1 g at 03/02/19 1152  •  guaiFENesin (MUCINEX) 12 hr tablet 600 mg, 600 mg, Oral, Q12H, Huseyin Ramesh MD, 600 mg at 03/02/19 0970  •  ipratropium-albuterol (DUO-NEB) nebulizer solution 3 mL, 3 mL, Nebulization, Q4H - RT, Huseyin Ramesh MD, 3 mL at 03/02/19 1136  •   LORazepam (ATIVAN) injection 0.5 mg, 0.5 mg, Intravenous, Q4H PRN, Sumit Ramesh MD, 0.5 mg at 02/28/19 1946  •  metoprolol succinate XL (TOPROL-XL) 24 hr tablet 50 mg, 50 mg, Oral, Daily, Sumit Ramesh MD, 50 mg at 03/02/19 0926  •  oseltamivir (TAMIFLU) capsule 30 mg, 30 mg, Oral, Q24H, Sumit Ramesh MD, 30 mg at 03/02/19 0926  •  pantoprazole (PROTONIX) EC tablet 40 mg, 40 mg, Oral, Daily, Sumit Ramesh MD, 40 mg at 03/02/19 0926  •  sennosides-docusate sodium (SENOKOT-S) 8.6-50 MG tablet 1 tablet, 1 tablet, Oral, Daily, Sumit Ramesh MD, 1 tablet at 02/28/19 0830  •  [COMPLETED] Insert peripheral IV, , , Once **AND** sodium chloride 0.9 % flush 10 mL, 10 mL, Intravenous, PRN, Marianela Draper APRN, 10 mL at 02/28/19 0830  •  tiZANidine (ZANAFLEX) tablet 2 mg, 2 mg, Oral, Nightly PRN, Sumit Ramesh MD     ASSESSMENT  Bibasilar pneumonia community-acquired infectious  Recent influenza A  Dehydration resolved  Coronary artery disease  Chronic atrial fibrillation on Eliquis  Hypertension  Hyperlipidemia  Large hiatal hernia  Dementia  Gastroesophageal reflux disease    PLAN  CPM  IV antibiotics   Tamiflu for 5 days  Encourage p.o. intake  Continue home medication and adjust the doses  Stress ulcer DVT prophylaxis  Discussed with family  Patient is DNR and does not want feeding tube dialysis either  Discharge planning    SUMIT RAMESH MD

## 2019-03-02 NOTE — THERAPY EVALUATION
Acute Care - Occupational Therapy Initial Evaluation  [facility]     Patient Name: Lacy Valerio  : 1929  MRN: [id number]  Today's Date: 3/2/2019  Onset of Illness/Injury or Date of Surgery: 19     Referring Physician: Gadiel    Admit Date: 2019       ICD-10-CM ICD-9-CM   1. Community acquired pneumonia, unspecified laterality J18.9 486   2. Influenza A J10.1 487.1   3. Impaired functional mobility and endurance Z74.09 V49.89     Patient Active Problem List   Diagnosis   • Closed fracture of neck of right femur (CMS/HCC)   • Brianna-prosthetic fracture around prosthetic hip   • Community acquired pneumonia     Past Medical History:   Diagnosis Date   • Aortic valve regurgitation    • Atrial fibrillation (CMS/[facility], located within [facility])    • CAD (coronary artery disease)    • Compression fracture of thoracic vertebra (CMS/[facility], located within [facility])    • Hyperlipidemia    • Hypertension    • MI (myocardial infarction) (CMS/[facility], located within [facility])    • Stroke (CMS/[facility]AC, located within [facility])    • TIA (transient ischemic attack)      Past Surgical History:   Procedure Laterality Date   • CARDIAC CATHETERIZATION     • CARDIAC SURGERY     • CAROTID STENT     • CORONARY ARTERY BYPASS GRAFT     • HIP ENDOPROSTHESIS Right 2018    Procedure: RT. HIP ENDOPROSTHESIS ANTERIOR;  Surgeon: Tyler Tolliver MD;  Location: [facility] OR;  Service:    • HYSTERECTOMY     • JOINT REPLACEMENT Bilateral     hip   • PARTIAL HIP ARTHROPLASTY Left           OT ASSESSMENT FLOWSHEET (last 72 hours)      Occupational Therapy Evaluation     Row Name 19 1424                   OT Evaluation Time/Intention    Subjective Information  no complaints  -RP        Document Type  evaluation  -RP        Mode of Treatment  occupational therapy  -RP        Patient Effort  good  -RP        Symptoms Noted During/After Treatment  none  -RP           General Information    Patient Profile Reviewed?  yes  -RP        Patient Observations  alert;cooperative;agree to therapy  -RP        Patient/Family Observations  pt  sister and granddaughter present  -RP        General Observations of Patient  pt semi-supine in bed w/ no signs of acute distress; agreeable to therapy  -RP        Prior Level of Function  -- fam states 24/7 caregiver present; likely Supervision level  -RP        Equipment Currently Used at Home  walker, rolling  -RP        Pertinent History of Current Functional Problem  influenza A; bilateral PNA; h/o dementia   -RP        Existing Precautions/Restrictions  fall  -RP        Risks Reviewed  patient and family:  -RP        Benefits Reviewed  patient and family:  -RP           Relationship/Environment    Lives With  child(jacob), adult  -RP           Resource/Environmental Concerns    Current Living Arrangements  home/apartment/condo  -RP           Cognitive Assessment/Intervention- PT/OT    Orientation Status (Cognition)  oriented to;person  -RP        Follows Commands (Cognition)  follows one step commands;over 90% accuracy;verbal cues/prompting required;repetition of directions required  -RP        Safety Deficit (Cognitive)  severe deficit;insight into deficits/self awareness;safety precautions awareness  -RP           Bed Mobility Assessment/Treatment    Bed Mobility Assessment/Treatment  supine-sit;sit-supine;scooting/bridging  -RP        Scooting/Bridging [place] (Bed Mobility)  minimum assist (75% patient effort) to scoot up in the bed  -RP        Supine-Sit [place] (Bed Mobility)  contact guard  -RP        Sit-Supine [place] (Bed Mobility)  contact guard  -RP        Assistive Device (Bed Mobility)  bed rails;head of bed elevated  -RP           Transfer Assessment/Treatment    Transfer Assessment/Treatment  sit-stand transfer;stand-sit transfer  -RP           Sit-Stand Transfer    Sit-Stand [place] (Transfers)  contact guard;verbal cues  -RP           Stand-Sit Transfer    Stand-Sit [place] (Transfers)  contact guard;verbal cues  -RP           ADL Assessment/Intervention    BADL  Assessment/Intervention  lower body dressing;grooming  -RP           Lower Body Dressing Assessment/Training    Lower Body Dressing [place] Level  doff;don;socks;supervision;verbal cues  -RP        Lower Body Dressing Position  edge of bed sitting  -RP           Grooming Assessment/Training    [place] Level (Grooming)  grooming skills;hair care, combing/brushing;set up;supervision  -RP        Grooming Position  edge of bed sitting  -RP           General ROM    GENERAL ROM COMMENTS  B UE AROM WFL  -RP           MMT (Manual Muscle Testing)    General MMT Comments  B UE MMT: grossly approx. 3/5  -RP           Motor Assessment/Interventions    Additional Documentation  Balance (Group)  -RP           Balance    Balance  static sitting balance;static standing balance  -RP           Static Sitting Balance    Level of [place] (Unsupported Sitting, Static Balance)  supervision  -RP        Sitting Position (Unsupported Sitting, Static Balance)  sitting on edge of bed  -RP        Time Able to Maintain Position (Unsupported Sitting, Static Balance)  4 to 5 minutes  -RP           Static Standing Balance    Level of [place] (Supported Standing, Static Balance)  contact guard assist  -RP        Time Able to Maintain Position (Supported Standing, Static Balance)  1 to 2 minutes  -RP           Positioning and Restraints    Pre-Treatment Position  in bed  -RP        Post Treatment Position  bed  -RP        In Bed  fowlers;call light within reach;encouraged to call for assist;exit alarm on;with family/caregiver  -RP           Pain Assessment    Additional Documentation  Pain Scale: Word Pre/Post-Treatment (Group)  -RP           Pain Scale: Word Pre/Post-Treatment    Pain: Word Scale, Pretreatment  0 - no pain  -RP        Pain: Word Scale, Post-Treatment  0 - no pain  -RP           Coping    Observed Emotional State  accepting;calm;cooperative  -RP        Verbalized Emotional State  acceptance  -RP           Plan of  Care Review    Plan of Care Reviewed With  patient;family  -RP           Clinical Impression (OT)    OT Diagnosis  Pt presents to OT eval w/ generalized weakness and decreased balance, impacting indep and and safety w/ self-care tasks.  -RP        Criteria for Skilled Therapeutic Interventions Met (OT Eval)  yes;treatment indicated  -RP        Rehab Potential (OT Eval)  good, to achieve stated therapy goals  -RP        Therapy Frequency (OT Eval)  5 times/wk  -RP        Care Plan Review (OT)  evaluation/treatment results reviewed;care plan/treatment goals reviewed;patient/other agree to care plan  -RP        Anticipated Discharge Disposition (OT)  home with 24/7 care  -RP           Planned OT Interventions    Planned Therapy Interventions (OT Eval)  activity tolerance training;BADL retraining;functional balance retraining;patient/caregiver education/training;ROM/therapeutic exercise;strengthening exercise;transfer/mobility retraining  -RP           OT Goals    Transfer Goal Selection (OT)  transfer, OT goal 1  -RP        Toileting Goal Selection (OT)  toileting, OT goal 1  -RP        Strength Goal Selection (OT)  strength, OT goal 1  -RP        Additional Documentation  Strength Goal Selection (OT) (Row)  -RP           Transfer Goal 1 (OT)    Activity/Assistive Device (Transfer Goal 1, OT)  toilet  -RP        Time Frame (Transfer Goal 1, OT)  long term goal (LTG)  -RP        Progress/Outcome (Transfer Goal 1, OT)  goal ongoing  -RP           Toileting Goal 1 (OT)    Activity/Device (Toileting Goal 1, OT)  toileting skills, all  -RP        Time Frame (Toileting Goal 1, OT)  long term goal (LTG)  -RP        Progress/Outcome (Toileting Goal 1, OT)  goal ongoing  -RP           Strength Goal 1 (OT)    Strength Goal 1 (OT)  Pt will increase B UE strength to approx. 3+/5 to assist w/ ADLs and transfers.  -RP        Time Frame (Strength Goal 1, OT)  long term goal (LTG)  -RP        Progress/Outcome (Strength Goal 1, OT)   goal ongoing  -RP          User Key  (r) = Recorded By, (t) = Taken By, (c) = Cosigned By    Initials Name Effective Dates    RP Nata Bridges OT 05/03/18 -          Occupational Therapy Education     Title: PT OT SLP Therapies (In Progress)     Topic: Occupational Therapy (In Progress)     Point: ADL training (In Progress)     Description: Instruct learner(s) on proper safety adaptation and remediation techniques during self care or transfers.   Instruct in proper use of assistive devices.    Learning Progress Summary           Patient SIM Tavera, NR by RP at 3/2/2019  3:06 PM    Comment:  OT educ on OT role in therapeutic process and pt's POC. Pt family verbalized understanding; pt needs reinforcement   Family SIM Tavera, NR by RP at 3/2/2019  3:06 PM    Comment:  OT educ on OT role in therapeutic process and pt's POC. Pt family verbalized understanding; pt needs reinforcement                               User Key     Initials Effective Dates Name Provider Type Discipline    RP 05/03/18 -  Nata Bridges OT Occupational Therapist OT                  OT Recommendation and Plan  Outcome Summary/Treatment Plan (OT)  Anticipated Discharge Disposition (OT): home with 24/7 care  Planned Therapy Interventions (OT Eval): activity tolerance training, BADL retraining, functional balance retraining, patient/caregiver education/training, ROM/therapeutic exercise, strengthening exercise, transfer/mobility retraining  Therapy Frequency (OT Eval): 5 times/wk  Plan of Care Review  Plan of Care Reviewed With: patient, family  Plan of Care Reviewed With: patient, family  Outcome Summary: Pt is an 89 year old female admitted w/ influenza A and bilateral PNA w/ h/o dementia. Pt lives w/ family, has 24/7 caregivers. Pt presents to OT eval w/ generalized weakness and decreased balance, impacting indep and safety w/ self-care tasks. Pt able to complete LB dressing task w/ Sup, able to complete STS transfer w/ CGA. Pt may benefit from  skilled OT services to improve indep and safety w/ ADLs and transfers. Anticipate d/c home w/ 24/7 assistance.    Outcome Measures     Row Name 03/02/19 1500 03/02/19 1300          How much help from another person do you currently need...    Turning from your back to your side while in flat bed without using bedrails?  --  4  -SV     Moving from lying on back to sitting on the side of a flat bed without bedrails?  --  3  -SV     Moving to and from a bed to a chair (including a wheelchair)?  --  3  -SV     Standing up from a chair using your arms (e.g., wheelchair, bedside chair)?  --  3  -SV     Climbing 3-5 steps with a railing?  --  3  -SV     To walk in hospital room?  --  3  -SV     AM-PAC 6 Clicks Score  --  19  -SV        How much help from another is currently needed...    Putting on and taking off regular lower body clothing?  3  -RP  --     Bathing (including washing, rinsing, and drying)  3  -RP  --     Toileting (which includes using toilet bed pan or urinal)  3  -RP  --     Putting on and taking off regular upper body clothing  3  -RP  --     Taking care of personal grooming (such as brushing teeth)  3  -RP  --     Eating meals  3  -RP  --     Score  18  -RP  --        Functional Assessment    Outcome Measure Options  AM-PAC 6 Clicks Daily Activity (OT)  -RP  --       User Key  (r) = Recorded By, (t) = Taken By, (c) = Cosigned By    Initials Name Provider Type    Jyotsna Page, PT Physical Therapist    Nata Wilkinson OT Occupational Therapist          Time Calculation:   Time Calculation- OT     Row Name 03/02/19 1512             Time Calculation- OT    OT Start Time  1414  -RP      OT Stop Time  1424  -RP      OT Time Calculation (min)  10 min  -RP      OT Received On  03/02/19  -RP        User Key  (r) = Recorded By, (t) = Taken By, (c) = Cosigned By    Initials Name Provider Type    Nata Wilkinson, HIMANSHU Occupational Therapist        Therapy Suggested Charges     Code   Minutes Charges     None           Therapy Charges for Today     Code Description Service Date Service Provider Modifiers Qty    [id number] HC OT EVAL MOD COMPLEXITY 2 3/2/2019 Nata Bridges, OT GO 1               Nata Bridges, HIMANSHU  3/2/2019

## 2019-03-02 NOTE — PLAN OF CARE
Problem: Patient Care Overview  Goal: Plan of Care Review   03/02/19 0358   Coping/Psychosocial   Plan of Care Reviewed With patient   OTHER   Outcome Summary pt not tolerating breathing treatments, family encouraging patient to take treatments

## 2019-03-02 NOTE — PLAN OF CARE
Problem: Fall Risk (Adult)  Goal: Absence of Fall  Outcome: Ongoing (interventions implemented as appropriate)      Problem: Patient Care Overview  Goal: Plan of Care Review  Outcome: Ongoing (interventions implemented as appropriate)   03/02/19 1821   Coping/Psychosocial   Plan of Care Reviewed With patient   Plan of Care Review   Progress improving   OTHER   Outcome Summary No falls. No distress. Remains on IV abx. Cont to monitor.     Goal: Individualization and Mutuality  Outcome: Ongoing (interventions implemented as appropriate)    Goal: Discharge Needs Assessment  Outcome: Ongoing (interventions implemented as appropriate)    Goal: Interprofessional Rounds/Family Conf  Outcome: Ongoing (interventions implemented as appropriate)      Problem: Skin Injury Risk (Adult)  Goal: Skin Health and Integrity  Outcome: Ongoing (interventions implemented as appropriate)      Problem: Nutrition, Imbalanced: Inadequate Oral Intake (Adult)  Goal: Improved Oral Intake  Outcome: Ongoing (interventions implemented as appropriate)    Goal: Prevent Further Weight Loss  Outcome: Ongoing (interventions implemented as appropriate)

## 2019-03-02 NOTE — PLAN OF CARE
Problem: Patient Care Overview  Goal: Plan of Care Review  Outcome: Ongoing (interventions implemented as appropriate)   03/02/19 1323   Coping/Psychosocial   Plan of Care Reviewed With patient;family   OTHER   Outcome Summary Pt is an 89 y.o.f.admit with PNA due to influenza A. Pt is agitated this morning and initially refused. Pt agreed to short distance amb only with return to supine. Pt difficult to evaluate rom/strength due to agitation. Pt able to amb 100' with rwx and cga. Expect home with caregivers at d/c. PT will attempt to see daily for progression with amb as able.

## 2019-03-03 LAB
ANION GAP SERPL CALCULATED.3IONS-SCNC: 14.5 MMOL/L
BASOPHILS # BLD AUTO: 0.03 10*3/MM3 (ref 0–0.2)
BASOPHILS NFR BLD AUTO: 0.4 % (ref 0–1.5)
BUN BLD-MCNC: 12 MG/DL (ref 8–23)
BUN/CREAT SERPL: 19.7 (ref 7–25)
CALCIUM SPEC-SCNC: 9.3 MG/DL (ref 8.6–10.5)
CHLORIDE SERPL-SCNC: 107 MMOL/L (ref 98–107)
CO2 SERPL-SCNC: 20.5 MMOL/L (ref 22–29)
CREAT BLD-MCNC: 0.61 MG/DL (ref 0.57–1)
DEPRECATED RDW RBC AUTO: 45 FL (ref 37–54)
EOSINOPHIL # BLD AUTO: 0.13 10*3/MM3 (ref 0–0.4)
EOSINOPHIL NFR BLD AUTO: 1.5 % (ref 0.3–6.2)
ERYTHROCYTE [DISTWIDTH] IN BLOOD BY AUTOMATED COUNT: 14.1 % (ref 12.3–15.4)
GFR SERPL CREATININE-BSD FRML MDRD: 92 ML/MIN/1.73
GLUCOSE BLD-MCNC: 102 MG/DL (ref 65–99)
HCT VFR BLD AUTO: 42.1 % (ref 34–46.6)
HGB BLD-MCNC: 13.1 G/DL (ref 12–15.9)
IMM GRANULOCYTES # BLD AUTO: 0.05 10*3/MM3 (ref 0–0.05)
IMM GRANULOCYTES NFR BLD AUTO: 0.6 % (ref 0–0.5)
LYMPHOCYTES # BLD AUTO: 1.1 10*3/MM3 (ref 0.7–3.1)
LYMPHOCYTES NFR BLD AUTO: 13 % (ref 19.6–45.3)
MCH RBC QN AUTO: 27 PG (ref 26.6–33)
MCHC RBC AUTO-ENTMCNC: 31.1 G/DL (ref 31.5–35.7)
MCV RBC AUTO: 86.8 FL (ref 79–97)
MONOCYTES # BLD AUTO: 0.5 10*3/MM3 (ref 0.1–0.9)
MONOCYTES NFR BLD AUTO: 5.9 % (ref 5–12)
NEUTROPHILS # BLD AUTO: 6.66 10*3/MM3 (ref 1.4–7)
NEUTROPHILS NFR BLD AUTO: 78.6 % (ref 42.7–76)
NRBC BLD AUTO-RTO: 0 /100 WBC (ref 0–0)
PLATELET # BLD AUTO: 462 10*3/MM3 (ref 140–450)
PMV BLD AUTO: 9.1 FL (ref 6–12)
POTASSIUM BLD-SCNC: 3.9 MMOL/L (ref 3.5–5.2)
RBC # BLD AUTO: 4.85 10*6/MM3 (ref 3.77–5.28)
SODIUM BLD-SCNC: 142 MMOL/L (ref 136–145)
WBC NRBC COR # BLD: 8.47 10*3/MM3 (ref 3.4–10.8)

## 2019-03-03 PROCEDURE — 80048 BASIC METABOLIC PNL TOTAL CA: CPT | Performed by: HOSPITALIST

## 2019-03-03 PROCEDURE — 96366 THER/PROPH/DIAG IV INF ADDON: CPT

## 2019-03-03 PROCEDURE — 96376 TX/PRO/DX INJ SAME DRUG ADON: CPT

## 2019-03-03 PROCEDURE — 25010000002 LORAZEPAM PER 2 MG: Performed by: HOSPITALIST

## 2019-03-03 PROCEDURE — 85025 COMPLETE CBC W/AUTO DIFF WBC: CPT | Performed by: HOSPITALIST

## 2019-03-03 PROCEDURE — 25010000002 AZITHROMYCIN PER 500 MG: Performed by: HOSPITALIST

## 2019-03-03 PROCEDURE — G0378 HOSPITAL OBSERVATION PER HR: HCPCS

## 2019-03-03 PROCEDURE — 25010000002 CEFTRIAXONE PER 250 MG: Performed by: HOSPITALIST

## 2019-03-03 RX ORDER — METOPROLOL SUCCINATE 25 MG/1
25 TABLET, EXTENDED RELEASE ORAL DAILY
Status: DISCONTINUED | OUTPATIENT
Start: 2019-03-04 | End: 2019-03-04 | Stop reason: HOSPADM

## 2019-03-03 RX ADMIN — AMLODIPINE BESYLATE 5 MG: 5 TABLET ORAL at 08:37

## 2019-03-03 RX ADMIN — PANTOPRAZOLE SODIUM 40 MG: 40 TABLET, DELAYED RELEASE ORAL at 08:37

## 2019-03-03 RX ADMIN — ASPIRIN 81 MG: 81 TABLET, CHEWABLE ORAL at 08:37

## 2019-03-03 RX ADMIN — GUAIFENESIN 600 MG: 600 TABLET, EXTENDED RELEASE ORAL at 08:37

## 2019-03-03 RX ADMIN — ATORVASTATIN CALCIUM 10 MG: 10 TABLET, FILM COATED ORAL at 08:37

## 2019-03-03 RX ADMIN — METOPROLOL SUCCINATE 50 MG: 50 TABLET, FILM COATED, EXTENDED RELEASE ORAL at 08:37

## 2019-03-03 RX ADMIN — AZITHROMYCIN MONOHYDRATE 500 MG: 500 INJECTION, POWDER, LYOPHILIZED, FOR SOLUTION INTRAVENOUS at 11:37

## 2019-03-03 RX ADMIN — LORAZEPAM 0.5 MG: 2 INJECTION INTRAMUSCULAR; INTRAVENOUS at 13:11

## 2019-03-03 RX ADMIN — SENNOSIDES AND DOCUSATE SODIUM 1 TABLET: 8.6; 5 TABLET ORAL at 08:37

## 2019-03-03 RX ADMIN — APIXABAN 2.5 MG: 2.5 TABLET, FILM COATED ORAL at 08:37

## 2019-03-03 RX ADMIN — OSELTAMIVIR PHOSPHATE 30 MG: 30 CAPSULE ORAL at 08:37

## 2019-03-03 RX ADMIN — CEFTRIAXONE SODIUM 1 G: 1 INJECTION, SOLUTION INTRAVENOUS at 11:06

## 2019-03-03 NOTE — SIGNIFICANT NOTE
"Pt sedated after dose of ativan. CNA reports pt has amb several times already and had become agitated \"worse with any activity\". Pt will check on tomorrow.   "

## 2019-03-03 NOTE — PLAN OF CARE
Problem: Fall Risk (Adult)  Goal: Absence of Fall  Outcome: Ongoing (interventions implemented as appropriate)      Problem: Patient Care Overview  Goal: Plan of Care Review  Outcome: Ongoing (interventions implemented as appropriate)   03/03/19 7450   Coping/Psychosocial   Plan of Care Reviewed With patient   OTHER   Outcome Summary Patient resting well. Not in any distress Vital signs as recorded. Remained confuse-reoriented. Patient more cooperative and calm though confuse on time and situation. EKG done as ordered. Cardiology consult for Dr. Linn called. Fall precautions enforced. Monitored.

## 2019-03-03 NOTE — CONSULTS
Patient Name: Lacy Valerio  :1929  89 y.o.    Date of Admission: 2019  Encounter Provider: Usha Schrader MD  Date of Encounter Visit: 19  Place of Service: [facility] CARDIOLOGY  Referring Provider: Christopher Cha MD  Patient Care Team:  Virginia Fernández MD as PCP - General (Family Medicine)      Chief complaint: bradycardia with pauses      History of Present Illness:  Ms. Valerio is an 89 year old woman who follows with Dr. Daugherty and has history of dementia, Afib, CHF, hypertension, hyperlipidemia, type II diabetes, TIA, and coronary artery disease. She had MI in 1999 and has PTCA of the RCA; this was complicated by dissection during stent placement. She then had CABG with SVG to diagonal and SVG to PDA. She had recurrent chest pain days later and had repeat cardiac catheterization. It showed 80% stenosis of LAD, 20% stenosis of LCX, total occlusion of RCA, and patent stents to diagonal and PDA.     She developed Afib and had cardioversion in . The Afib became persistent and she was treated with warfarin and rate control.     Echocardiogram in  showed an ejection fraction of 60% to 65%, moderate biatrial enlargement, mild-to-moderate aortic regurgitation, moderate-to-severe mitral regurgitation, moderate tricuspid regurgitation with an RV systolic pressure of 41 mmHg.     She was admitted in 2015 after falling and fracturing her hip. She was noted to be in rapid Afib and was treated with IV cardizem. Echocardiogram showed EF 40-45%, severe biatrial enlargement, mild aortic regurgitation, moderate mitral regurgitation, mild to moderate tricuspid regurgitation, and RVSP 37mmHg.     She was admitted in 2018 after falling and re-fracturing her right hip. She was seen in consultation by Dr. Linn for Afib and troponin 0.027. Echocardiogram showed calcification of the aortic valve, mild to moderate aortic valve  regurgitation, mild aortic stenosis, moderate to severe mitral valve stenosis, moderate to severe tricuspid valve regurgitation, and EF 58%.    She was admitted in April 2018 after falling and fracturing her left hip. She was noted to be in rapid Afib. He digoxin ws stopped during to elevated digoxin level. Her apixaban was resumed postoperatively.     She was last seen at [facility] office in September 2018. Her digoxin had been resumed. No other changes were warranted at that time.     She was admitted on 2/27/19 with bilateral pneumonia and Influenza A. Echocardiogram shows sigmoid shaped ventricular septum, severely dilated left atrium, mild aortic regurgitation, mild mitral regurgitation, mild tricuspid regurgitation, and RVSP <35mmHg. She has been in Afib during this admission with HR initially 80s-100s. Last night, she was noted to have HR is the 50s to 80s between 10pm and 2am; and was noted to have a pause of 3.1 seconds. Her home dose of metoprolol has been continued; her home digoxin has been held.     She is feeling wiped out from the flu.  She is not having chest pain or shortness of breath.  She is coughing.  She denies any palpitations, presyncope or syncope.    Cardiac Testing:  Echocardiogram 3/1/19  Interpretation Summary     · Left ventricular wall thickness is consistent with hypertrophy. Sigmoid-shaped ventricular septum is present.  · Estimated EF = 55%.  · Left ventricular systolic function is normal.  · Left atrial cavity size is severely dilated.  · Mild aortic valve regurgitation is present.  · Mild mitral valve regurgitation is present  · Mild tricuspid valve regurgitation is present.  · Estimated right ventricular systolic pressure from tricuspid regurgitation is normal (<35 mmHg).     Echocardiogram 2/21/18  Interpretation Summary     · Left atrial cavity size is moderately dilated.  · There is calcification of the aortic valve.  · Mild to moderate aortic valve regurgitation is present.  · Mild  aortic valve stenosis is present.  · Moderate-to-severe mitral valve stenosis is present  · Moderate to severe tricuspid valve regurgitation is present.  · Mild pulmonic valve regurgitation is present.  · Left Ventricle: Calculated EF = 58.3%.  · There is no evidence of pericardial effusion     Echocardiogram 4/22/15      Past Medical History:   Diagnosis Date   • Aortic valve regurgitation    • Atrial fibrillation (CMS/HCC)    • CAD (coronary artery disease)    • Compression fracture of thoracic vertebra (CMS/HCC)    • Hyperlipidemia    • Hypertension    • MI (myocardial infarction) (CMS/HCC)    • Stroke (CMS/HCC)    • TIA (transient ischemic attack)        Past Surgical History:   Procedure Laterality Date   • CARDIAC CATHETERIZATION     • CARDIAC SURGERY     • CAROTID STENT     • CORONARY ARTERY BYPASS GRAFT     • HIP ENDOPROSTHESIS Right 2/21/2018    Procedure: RT. HIP ENDOPROSTHESIS ANTERIOR;  Surgeon: Tyler Tolliver MD;  Location: [facility];  Service:    • HYSTERECTOMY     • JOINT REPLACEMENT Bilateral     hip   • PARTIAL HIP ARTHROPLASTY Left          Prior to Admission medications    Medication Sig Start Date End Date Taking? Authorizing Provider   amLODIPine (NORVASC) 5 MG tablet Take 5 mg by mouth Daily.   Yes ProviderLevy MD   apixaban (ELIQUIS) 2.5 MG tablet tablet Take 2.5 mg by mouth 2 (Two) Times a Day.   Yes Provider, MD Levy   atorvastatin (LIPITOR) 10 MG tablet Take 10 mg by mouth Daily.   Yes Provider, MD Levy   digoxin (LANOXIN) 125 MCG tablet Take 125 mcg by mouth Daily.   Yes ProviderLevy MD   isosorbide mononitrate (IMDUR) 30 MG 24 hr tablet Take 30 mg by mouth Daily.   Yes ProviderLevy MD   metoprolol succinate XL (TOPROL-XL) 50 MG 24 hr tablet Take 50 mg by mouth Daily.   Yes Provider, MD Levy   pantoprazole (PROTONIX) 40 MG EC tablet Take 40 mg by mouth Daily.   Yes ProviderLevy MD   sennosides-docusate sodium (SENOKOT-S) 8.6-50  MG tablet Take 1 tablet by mouth 3 (Three) Times a Week if Needed.   Yes ProviderLevy MD   tiZANidine (ZANAFLEX) 4 MG tablet Take 2 mg by mouth At Night As Needed for Muscle Spasms.   Yes Levy Carrera MD   vitamin B-12 (CYANOCOBALAMIN) 500 MCG tablet Take 500 mcg by mouth Daily.   Yes ProviderLevy MD       Allergies   Allergen Reactions   • Penicillins Unknown (See Comments)     Pt does not remember reaction.   • Procainamide Hcl Unknown (See Comments)     Pt does not remember reaction.   • Procaine Unknown (See Comments)     Pt does not remember reaction.   • Sulfa Antibiotics Unknown (See Comments)     pt does not remember reaction       Social History     Socioeconomic History   • Marital status: Single     Spouse name: Not on file   • Number of children: Not on file   • Years of education: Not on file   • Highest education level: Not on file   Tobacco Use   • Smoking status: Former Smoker   • Smokeless tobacco: Never Used   Substance and Sexual Activity   • Alcohol use: No   • Drug use: No   • Sexual activity: Defer   Social History Narrative    Pt is unsure of what medical and surgical hx she has.     Pt is unaware of why she is on most of her medications as well.        History reviewed. No pertinent family history.    REVIEW OF SYSTEMS:   All other systems reviewed and negative.        Objective:     Vitals:    03/02/19 1405 03/02/19 1900 03/02/19 2300 03/03/19 0725   BP: 101/55 139/82 131/92 122/74   BP Location: Left arm Left arm Left arm Left arm   Patient Position: Lying Lying Lying Lying   Pulse: 99 80 82 99   Resp: 16 16 16 16   Temp: 97.7 °F (36.5 °C) 97.5 °F (36.4 °C) 97.5 °F (36.4 °C) 97.7 °F (36.5 °C)   TempSrc: Oral Oral Oral Oral   SpO2: 94% 97% 97% 95%   Weight:       Height:         Body mass index is 16.6 kg/m².    Intake/Output Summary (Last 24 hours) at 3/3/2019 1055  Last data filed at 3/3/2019 075  Gross per 24 hour   Intake 100 ml   Output --   Net 100 ml        Constitutional: She is oriented to person, place, and time. She appears well-developed. She does not appear ill.   HENT:   Head: Normocephalic and atraumatic. Head is without contusion.   Right Ear: Hearing normal. No drainage.   Left Ear: Hearing normal. No drainage.   Nose: No nasal deformity. No epistaxis.   Eyes: Lids are normal. Right eye exhibits no exudate. Left eye exhibits no exudate.  Neck: No JVD present. Carotid bruit is not present. No tracheal deviation present. No thyroid mass and no thyromegaly present.   Cardiovascular: irreg ireg  Pulmonary/Chest: Effort normal and breath sounds normal.   Abdominal: Soft. Normal appearance and bowel sounds are normal. There is no tenderness.   Musculoskeletal: Normal range of motion.        Right shoulder: She exhibits no deformity.        Left shoulder: She exhibits no deformity.   Neurological: She is alert and oriented to person, place, and time. She has normal strength.   Skin: Skin is warm, dry and intact. No rash noted.   Psychiatric: She has a normal mood and affect. Her behavior is normal. Thought content normal.   Vitals reviewed      Lab Review:     Results from last 7 days   Lab Units 03/03/19  0657  02/28/19  0642   SODIUM mmol/L 142   < > 143   POTASSIUM mmol/L 3.9   < > 3.7   CHLORIDE mmol/L 107   < > 104   CO2 mmol/L 20.5*   < > 25.6   BUN mg/dL 12   < > 21   CREATININE mg/dL 0.61   < > 0.71   CALCIUM mg/dL 9.3   < > 9.7   BILIRUBIN mg/dL  --   --  0.5   ALK PHOS U/L  --   --  69   ALT (SGPT) U/L  --   --  9   AST (SGOT) U/L  --   --  14   GLUCOSE mg/dL 102*   < > 146*    < > = values in this interval not displayed.         Results from last 7 days   Lab Units 03/03/19  0657   WBC 10*3/mm3 8.47   HEMOGLOBIN g/dL 13.1   HEMATOCRIT % 42.1   PLATELETS 10*3/mm3 462*             Results from last 7 days   Lab Units 02/28/19  0642   CHOLESTEROL mg/dL 119   TRIGLYCERIDES mg/dL 68   HDL CHOL mg/dL 32*   LDL CHOL mg/dL 73      Telemetry:                                  EKG:          I personally viewed and interpreted the patient's EKG/Telemetry data.        Assessment and Plan:       1.  Persistent atrial fibrillation.  Bradycardia and a 3.1-second pause noted.  Continue Eliquis.  Continue metoprolol.  Agree with holding digoxin for now.  2.  Influenza A.  3.  History of coronary artery disease.  No anginal symptoms.  EKG without ischemic changes.  4.  Valvular heart disease  5.  Pulmonary hypertension  6.  Systemic hypertension    Usha Schrader MD  03/03/19  10:55 AM

## 2019-03-03 NOTE — NURSING NOTE
Dr. Ramesh made aware  that patient is having pauses(asymptomatic) but  tonight is the longest 3.1 secs. Orders received.

## 2019-03-03 NOTE — PLAN OF CARE
Problem: Fall Risk (Adult)  Goal: Absence of Fall  Outcome: Ongoing (interventions implemented as appropriate)      Problem: Patient Care Overview  Goal: Plan of Care Review  Outcome: Ongoing (interventions implemented as appropriate)   03/03/19 1974   Coping/Psychosocial   Plan of Care Reviewed With patient   Plan of Care Review   Progress improving   OTHER   Outcome Summary No falls. No distess. Ambulated n hallways with staff. Meds changed d/t pause of 3.1 seconds last pm. Remains in A-Fib on monitor  but controlled rate. Cont to monitor     Goal: Individualization and Mutuality  Outcome: Ongoing (interventions implemented as appropriate)    Goal: Discharge Needs Assessment  Outcome: Ongoing (interventions implemented as appropriate)    Goal: Interprofessional Rounds/Family Conf  Outcome: Ongoing (interventions implemented as appropriate)      Problem: Skin Injury Risk (Adult)  Goal: Skin Health and Integrity  Outcome: Ongoing (interventions implemented as appropriate)      Problem: Nutrition, Imbalanced: Inadequate Oral Intake (Adult)  Goal: Improved Oral Intake  Outcome: Ongoing (interventions implemented as appropriate)    Goal: Prevent Further Weight Loss  Outcome: Ongoing (interventions implemented as appropriate)

## 2019-03-03 NOTE — PROGRESS NOTES
"Daily progress note    Chief complaint  Doing same  3.1-second AV block asymptomatic  Denies any chest pain shortness of breath or palpitations    History of present illness  89-year-old white female with history of dementia chronic atrial fibrillation coronary artery disease valvular heart disease compression fracture hypertension hyperlipidemia TIA and stroke in the past who lives at home with caregiver and family support brought to the emergency room with dry cough shortness of breath for last 1 week.  Patient denies any fever but complained of chills also denies chest pain increased shortness of breath abdominal pain nausea vomiting diarrhea.  Patient evaluated in ER found to have bilateral pneumonia admitted for management.  She was recently diagnosed with influenza A and getting treatment for that.     REVIEW OF SYSTEMS  Review of Systems   Unable to perform ROS: Dementia      PHYSICAL EXAM  Blood pressure 98/79, pulse 75, temperature 98.5 °F (36.9 °C), temperature source Oral, resp. rate 16, height 152.4 cm (60\"), weight 38.6 kg (85 lb), SpO2 95 %.    Constitutional: She is well-developed, well-nourished, and in no distress.   Pt is frail appearing and pleasantly confused.    Head: Normocephalic.   Mouth/Throat: Mucous membranes are normal.   Eyes: No scleral icterus.   Neck: Normal range of motion.   Cardiovascular: Normal rate, regular rhythm and normal heart sounds.   Pulmonary/Chest: Effort normal. She has wheezes. She has rhonchi.   Abdominal: Soft. Bowel sounds are normal. There is no tenderness.   Musculoskeletal: Normal range of motion. She exhibits no edema ( to lower extremities).   Neurological: She is alert.   Skin: Skin is warm and dry.   Psychiatric: Mood and affect normal.     LAB RESULTS  Lab Results (last 24 hours)     Procedure Component Value Units Date/Time    Blood Culture - Blood, Arm, Left [[id number]] Collected:  02/27/19 1037    Specimen:  Blood from Arm, Left Updated:  03/03/19 1100 "     Blood Culture No growth at 4 days    Blood Culture - Blood, Arm, Right [[id number]] Collected:  02/27/19 1037    Specimen:  Blood from Arm, Right Updated:  03/03/19 1100     Blood Culture No growth at 4 days    Basic Metabolic Panel [[id number]]  (Abnormal) Collected:  03/03/19 0657    Specimen:  Blood Updated:  03/03/19 0755     Glucose 102 mg/dL      BUN 12 mg/dL      Creatinine 0.61 mg/dL      Sodium 142 mmol/L      Potassium 3.9 mmol/L      Chloride 107 mmol/L      CO2 20.5 mmol/L      Calcium 9.3 mg/dL      eGFR Non African Amer 92 mL/min/1.73      BUN/Creatinine Ratio 19.7     Anion Gap 14.5 mmol/L     Narrative:       The MDRD GFR formula is only valid for adults with stable renal function between ages 18 and 70.    CBC & Differential [[id number]] Collected:  03/03/19 0657    Specimen:  Blood Updated:  03/03/19 0733    Narrative:       The following orders were created for panel order CBC & Differential.  Procedure                               Abnormality         Status                     ---------                               -----------         ------                     CBC Auto Differential[[id number]]        Abnormal            Final result                 Please view results for these tests on the individual orders.    CBC Auto Differential [[id number]]  (Abnormal) Collected:  03/03/19 0657    Specimen:  Blood Updated:  03/03/19 0733     WBC 8.47 10*3/mm3      RBC 4.85 10*6/mm3      Hemoglobin 13.1 g/dL      Hematocrit 42.1 %      MCV 86.8 fL      MCH 27.0 pg      MCHC 31.1 g/dL      RDW 14.1 %      RDW-SD 45.0 fl      MPV 9.1 fL      Platelets 462 10*3/mm3      Neutrophil % 78.6 %      Lymphocyte % 13.0 %      Monocyte % 5.9 %      Eosinophil % 1.5 %      Basophil % 0.4 %      Immature Grans % 0.6 %      Neutrophils, Absolute 6.66 10*3/mm3      Lymphocytes, Absolute 1.10 10*3/mm3      Monocytes, Absolute 0.50 10*3/mm3      Eosinophils, Absolute 0.13 10*3/mm3      Basophils, Absolute 0.03 10*3/mm3       Immature Grans, Absolute 0.05 10*3/mm3      nRBC 0.0 /100 WBC         Imaging Results (last 24 hours)     ** No results found for the last 24 hours. **          Current Facility-Administered Medications:   •  amLODIPine (NORVASC) tablet 5 mg, 5 mg, Oral, Daily, Huseyin Ramesh MD, 5 mg at 03/03/19 0837  •  apixaban (ELIQUIS) tablet 2.5 mg, 2.5 mg, Oral, Q12H, Huseyin Ramesh MD, 2.5 mg at 03/03/19 0837  •  aspirin chewable tablet 81 mg, 81 mg, Oral, Daily, Huseyin Ramesh MD, 81 mg at 03/03/19 0837  •  atorvastatin (LIPITOR) tablet 10 mg, 10 mg, Oral, Daily, Huseyin Ramesh MD, 10 mg at 03/03/19 0837  •  cefTRIAXone (ROCEPHIN) IVPB 1 g, 1 g, Intravenous, Q24H, Huseyin Ramesh MD, Stopped at 03/03/19 1137  •  guaiFENesin (MUCINEX) 12 hr tablet 600 mg, 600 mg, Oral, Q12H, Huseyin Ramesh MD, 600 mg at 03/03/19 0837  •  ipratropium-albuterol (DUO-NEB) nebulizer solution 3 mL, 3 mL, Nebulization, Q4H PRN, Huseyin Ramesh MD  •  LORazepam (ATIVAN) injection 0.5 mg, 0.5 mg, Intravenous, Q4H PRN, Huseyin Ramesh MD, 0.5 mg at 03/03/19 1311  •  metoprolol succinate XL (TOPROL-XL) 24 hr tablet 50 mg, 50 mg, Oral, Daily, Huseyin Ramesh MD, 50 mg at 03/03/19 0837  •  oseltamivir (TAMIFLU) capsule 30 mg, 30 mg, Oral, Q24H, Huseyin Ramesh MD, 30 mg at 03/03/19 0837  •  pantoprazole (PROTONIX) EC tablet 40 mg, 40 mg, Oral, Daily, Huseyin Ramesh MD, 40 mg at 03/03/19 0837  •  sennosides-docusate sodium (SENOKOT-S) 8.6-50 MG tablet 1 tablet, 1 tablet, Oral, Daily, Huseyin Ramesh MD, 1 tablet at 03/03/19 0837  •  [COMPLETED] Insert peripheral IV, , , Once **AND** sodium chloride 0.9 % flush 10 mL, 10 mL, Intravenous, PRN, Marianela Draper APRN, 10 mL at 02/28/19 0830  •  tiZANidine (ZANAFLEX) tablet 2 mg, 2 mg, Oral, Nightly PRN, Huseyin Ramesh MD     ASSESSMENT  AV block 3.1-second asymptomatic per cardiology  Bibasilar pneumonia community-acquired infectious  Recent influenza A  Dehydration resolved  Coronary artery disease  Chronic atrial  fibrillation on Eliquis  Hypertension  Hyperlipidemia  Large hiatal hernia  Dementia  Gastroesophageal reflux disease    PLAN  CPM  Monitor  No further  workup per cardiology  Off digoxin and adjust beta-blocker  IV antibiotics   Tamiflu for 5 days  Encourage p.o. intake  Continue home medication and adjust the doses  Stress ulcer DVT prophylaxis  Discussed with family  Patient is DNR and does not want feeding tube dialysis either  Discharge planning    SUMIT CHOU MD

## 2019-03-04 VITALS
WEIGHT: 85 LBS | RESPIRATION RATE: 14 BRPM | BODY MASS INDEX: 16.69 KG/M2 | TEMPERATURE: 97.5 F | DIASTOLIC BLOOD PRESSURE: 74 MMHG | SYSTOLIC BLOOD PRESSURE: 137 MMHG | HEART RATE: 107 BPM | OXYGEN SATURATION: 96 % | HEIGHT: 60 IN

## 2019-03-04 LAB
BACTERIA SPEC AEROBE CULT: NORMAL
BACTERIA SPEC AEROBE CULT: NORMAL

## 2019-03-04 PROCEDURE — 99213 OFFICE O/P EST LOW 20 MIN: CPT | Performed by: NURSE PRACTITIONER

## 2019-03-04 PROCEDURE — G0378 HOSPITAL OBSERVATION PER HR: HCPCS

## 2019-03-04 PROCEDURE — 96366 THER/PROPH/DIAG IV INF ADDON: CPT

## 2019-03-04 PROCEDURE — 97110 THERAPEUTIC EXERCISES: CPT

## 2019-03-04 PROCEDURE — 25010000002 CEFTRIAXONE PER 250 MG: Performed by: HOSPITALIST

## 2019-03-04 RX ORDER — METOPROLOL SUCCINATE 25 MG/1
25 TABLET, EXTENDED RELEASE ORAL DAILY
Qty: 30 TABLET | Refills: 0 | Status: SHIPPED | OUTPATIENT
Start: 2019-03-05 | End: 2019-04-04

## 2019-03-04 RX ORDER — CEFDINIR 300 MG/1
300 CAPSULE ORAL 2 TIMES DAILY
Qty: 10 CAPSULE | Refills: 0 | Status: SHIPPED | OUTPATIENT
Start: 2019-03-04 | End: 2019-03-09

## 2019-03-04 RX ORDER — ASPIRIN 81 MG/1
81 TABLET, CHEWABLE ORAL DAILY
Qty: 30 TABLET | Refills: 0 | Status: SHIPPED | OUTPATIENT
Start: 2019-03-05 | End: 2019-04-04

## 2019-03-04 RX ADMIN — ASPIRIN 81 MG: 81 TABLET, CHEWABLE ORAL at 09:41

## 2019-03-04 RX ADMIN — APIXABAN 2.5 MG: 2.5 TABLET, FILM COATED ORAL at 03:56

## 2019-03-04 RX ADMIN — CEFTRIAXONE SODIUM 1 G: 1 INJECTION, SOLUTION INTRAVENOUS at 11:00

## 2019-03-04 RX ADMIN — METOPROLOL SUCCINATE 25 MG: 25 TABLET, FILM COATED, EXTENDED RELEASE ORAL at 09:41

## 2019-03-04 RX ADMIN — GUAIFENESIN 600 MG: 600 TABLET, EXTENDED RELEASE ORAL at 03:56

## 2019-03-04 RX ADMIN — PANTOPRAZOLE SODIUM 40 MG: 40 TABLET, DELAYED RELEASE ORAL at 09:41

## 2019-03-04 RX ADMIN — ATORVASTATIN CALCIUM 10 MG: 10 TABLET, FILM COATED ORAL at 09:41

## 2019-03-04 RX ADMIN — SENNOSIDES AND DOCUSATE SODIUM 1 TABLET: 8.6; 5 TABLET ORAL at 09:41

## 2019-03-04 NOTE — NURSING NOTE
Am nurse Shona  Informed that ELiquis and Mucinex night dose was given at 0356 this am because patient was  more cooperative and did not refuse taking it. Patient  refuse to take it last night and does not even open her mouth-uncooperative.

## 2019-03-04 NOTE — PLAN OF CARE
Problem: Patient Care Overview  Goal: Plan of Care Review  Outcome: Ongoing (interventions implemented as appropriate)   03/04/19 7465   Coping/Psychosocial   Plan of Care Reviewed With patient   OTHER   Outcome Summary No falls. Not in any distress, no complaints of pain ,slept but arousable. Vital signs as charted. NIght meds refused last night but woke up  this am at 0356 more cooperative and able to take am meds- SEE MAR(eliquis and mucinex). Patient denies any pain. Fall precautions enforced. Monitored.     Goal: Individualization and Mutuality  Outcome: Ongoing (interventions implemented as appropriate)      Problem: Skin Injury Risk (Adult)  Goal: Skin Health and Integrity  Outcome: Ongoing (interventions implemented as appropriate)

## 2019-03-04 NOTE — PROGRESS NOTES
[facility]  Cardiology Progress Note    Patient Identification:  Name: Lacy Valerio  Age: 89 y.o.  Sex: female  :  1929  MRN: [id number]                 Follow Up / Chief Complaint: bradycardia with pauses    Interval History: Digoxin held for pause with bradycardia. Metoprolol continued at decreased dose, no more significant pauses noted.      Subjective: Denies chest pain and SOA.  Refuses medications at times and is demanding to go home.  Very confused.       Objective:    Past Medical History:  Past Medical History:   Diagnosis Date   • Aortic valve regurgitation    • Atrial fibrillation (CMS/HCC)    • CAD (coronary artery disease)    • Compression fracture of thoracic vertebra (CMS/HCC)    • Hyperlipidemia    • Hypertension    • MI (myocardial infarction) (CMS/HCC)    • Stroke (CMS/HCC)    • TIA (transient ischemic attack)      Past Surgical History:  Past Surgical History:   Procedure Laterality Date   • CARDIAC CATHETERIZATION     • CARDIAC SURGERY     • CAROTID STENT     • CORONARY ARTERY BYPASS GRAFT     • HIP ENDOPROSTHESIS Right 2018    Procedure: RT. HIP ENDOPROSTHESIS ANTERIOR;  Surgeon: Tyler Tolliver MD;  Location: [facility];  Service:    • HYSTERECTOMY     • JOINT REPLACEMENT Bilateral     hip   • PARTIAL HIP ARTHROPLASTY Left         Social History:   Social History     Tobacco Use   • Smoking status: Former Smoker   • Smokeless tobacco: Never Used   Substance Use Topics   • Alcohol use: No      Family History:  History reviewed. No pertinent family history.       Allergies:  Allergies   Allergen Reactions   • Penicillins Unknown (See Comments)     Pt does not remember reaction.   • Procainamide Hcl Unknown (See Comments)     Pt does not remember reaction.   • Procaine Unknown (See Comments)     Pt does not remember reaction.   • Sulfa Antibiotics Unknown (See Comments)     pt does not remember reaction     Scheduled Meds:    apixaban 2.5 mg Q12H   aspirin 81 mg  "Daily   atorvastatin 10 mg Daily   ceftriaxone 1 g Q24H   guaiFENesin 600 mg Q12H   metoprolol succinate XL 25 mg Daily   pantoprazole 40 mg Daily   sennosides-docusate sodium 1 tablet Daily       I have reviewed the patient's history, home and current medications.       INTAKE AND OUTPUT:    Intake/Output Summary (Last 24 hours) at 3/4/2019 0918  Last data filed at 3/4/2019 0356  Gross per 24 hour   Intake 50 ml   Output --   Net 50 ml       Review of Systems:   GI: Denies abdominal pain, n/v/d  Cardiac: Denies chest pain and palpitations  Pulmonary: Denies shortness of breath, positive for non-productive cough    Constitutional:  Temp:  [97.3 °F (36.3 °C)-98.5 °F (36.9 °C)] 97.3 °F (36.3 °C)  Heart Rate:  [] 120  Resp:  [14-20] 14  BP: ()/(73-97) 120/97    Physical Exam:  General:  Appears in no acute distress; resting in chair  Eyes: PERTL,  HEENT:  No JVD. Thyroid not visibly enlarged. No mucosal pallor or cyanosis  Respiratory: Respirations regular and unlabored at rest. BBS with good air entry in all fields. No crackles, rubs or wheezes auscultated  Cardiovascular: S1S2 irregular rate and rhythm. No murmur, rub or gallop. No carotid bruits. DP/PT pulses 2+. No pretibial pitting edema  Gastrointestinal: Abdomen soft, flat, non tender. Bowel sounds present. No hepatosplenomegaly. No ascites  Musculoskeletal: STEVEN x4. No abnormal movements  Extremities: No digital clubbing or cyanosis  Skin: Color pink. Skin warm and dry to touch. No rashes    Neuro: AAO x1 (person) CN II-XII grossly intact  Psych: Mood and affect agitated, intermittently cooperative, able to follow commands.     /74 (BP Location: Left arm, Patient Position: Sitting)   Pulse 107   Temp 97.5 °F (36.4 °C) (Oral)   Resp 14   Ht 152.4 cm (60\")   Wt 38.6 kg (85 lb)   SpO2 96%   BMI 16.60 kg/m²   General appearance: No acute changes   Neck: Trachea midline; NECK, supple, no thyromegaly or lymphadenopathy   Lungs: Normal size and " shape, normal breath sounds, equal distribution of air, no rales and rhonchi   CV: S1-S2 regular, no murmurs, no rub, no gallop   Abdomen: Soft, non-tender; no masses , no abnormal abdominal sounds   Extremities: No deformity , normal color , no peripheral edema   Skin: Normal temperature, turgor and texture; no rash, ulcers          Cardiographics  Telemetry:     3/4 atrial fibrillation rate 100's        3/2 3 second pause       ECG:     3/2 atrial fibrillation, slight ST depression anterolateral leads-similar to prior tracing        4/2018 atrial fib with PVCs, ST depression anterolateral leads rate 80's        I have reviewed and interpreted the ECG and telemetry      Echocardiogram:     3/1/19  Interpretation Summary     · Left ventricular wall thickness is consistent with hypertrophy. Sigmoid-shaped ventricular septum is present.  · Estimated EF = 55%.  · Left ventricular systolic function is normal.  · Left atrial cavity size is severely dilated.  · Mild aortic valve regurgitation is present.  · Mild mitral valve regurgitation is present  · Mild tricuspid valve regurgitation is present.  · Estimated right ventricular systolic pressure from tricuspid regurgitation is normal (<35 mmHg).           Lab Review           Results from last 7 days   Lab Units 03/03/19  0657   SODIUM mmol/L 142   POTASSIUM mmol/L 3.9   BUN mg/dL 12   CREATININE mg/dL 0.61   CALCIUM mg/dL 9.3       Results from last 7 days   Lab Units 03/03/19  0657 03/02/19  0559 03/01/19  0859   WBC 10*3/mm3 8.47 7.21 10.26   HEMOGLOBIN g/dL 13.1 12.1 12.5   HEMATOCRIT % 42.1 38.6 40.2   PLATELETS 10*3/mm3 462* 480* 495*         I have discussed the medical decision making in detail with Dr. Linn.     Assessment/plan    1. Bradycardia with pause-no further pauses noted since reduction of BB dosage and discontinuation of digoxin.  Echo 3/1 revealed LVH, sigmoid shaped ventricular septum, EF 55%, severe dilation of LAC, mild MR/AR/TR.  ECG  "unchanged from prior tracing. Defer ischemic workup at this time as patient is intermittently very uncooperative, and dementia is significant.      2.  Atrial fibrillation-patient currently rate controlled in the 80s.  Eliquis dose given late last night as patient refused to take medications.  Unclear if patient is consistently compliant with medication use.  Will continue to monitor.    3.  Influenza A-currently on Tamiflu.  Defer to IM.    4.  CAD-history of.  Currently denies any chest pain, ECG without any ischemic changes.  Unchanged from prior tracing. History or CABG. Defer ischemic workup at this time as patient is intermittently very uncooperative, and dementia is significant. Asymptomatic at this time.     5.  Hypertension-currently controlled on decreased dose of beta-blocker.  We will continue to monitor.        Labs/tests ordered for am ECG, bmp, mag      3/4/2019  VINOD Donahue    Patient personally interviewed and above subjective findings personally confirmed during a face to face contact with patient today  All findings of physical examination confirmed  All pertinent and performed labs, cardiac procedures ,  radiographs of the last 24 hours personally reviewed  Impression and plans discussed/elaborated and implemented jointly as described above   Manny Linn MD      EMR Dragon/Transcription:   \"Dictated utilizing Dragon dictation\".   "

## 2019-03-04 NOTE — THERAPY TREATMENT NOTE
Acute Care - Physical Therapy Treatment Note  [facility]     Patient Name: Lacy Valerio  : 1929  MRN: [id number]  Today's Date: 3/4/2019  Onset of Illness/Injury or Date of Surgery: 19  Date of Referral to PT: 19  Referring Physician: Gadiel    Admit Date: 2019    Visit Dx:    ICD-10-CM ICD-9-CM   1. Community acquired pneumonia, unspecified laterality J18.9 486   2. Influenza A J10.1 487.1   3. Impaired functional mobility and endurance Z74.09 V49.89     Patient Active Problem List   Diagnosis   • Closed fracture of neck of right femur (CMS/HCC)   • Brianna-prosthetic fracture around prosthetic hip   • Community acquired pneumonia       Therapy Treatment    Rehabilitation Treatment Summary     Row Name 19 08             Treatment Time/Intention    Discipline  physical therapist  -AR      Document Type  therapy note (daily note)  -AR      Subjective Information  no complaints  -AR      Mode of Treatment  physical therapy  -AR      Therapy Frequency (PT Clinical Impression)  daily  -AR      Patient Effort  good  -AR      Existing Precautions/Restrictions  fall  -AR      Recorded by [AR] Mary Antony, PT 19 1300      Row Name 19 0826             Vital Signs    O2 Delivery Pre Treatment  room air  -AR      Recorded by [AR] Mary Antony, PT 19 1300      Row Name 19 0826             Cognitive Assessment/Intervention- PT/OT    Orientation Status (Cognition)  oriented to;person;place  -AR      Follows Commands (Cognition)  follows one step commands;verbal cues/prompting required  -AR      Recorded by [AR] Mary Antony, PT 19 1300      Row Name 19 0826             Bed Mobility Assessment/Treatment    Bed Mobility Assessment/Treatment  supine-sit;sit-supine  -AR      Supine-Sit [place] (Bed Mobility)  minimum assist (75% patient effort)  -AR      Sit-Supine [place] (Bed Mobility)  not tested  -AR      Assistive Device (Bed Mobility)   bed rails;head of bed elevated  -AR      Recorded by [AR] Mary Antony, PT 03/04/19 1300      Row Name 03/04/19 0826             Transfer Assessment/Treatment    Transfer Assessment/Treatment  sit-stand transfer;stand-sit transfer  -AR      Recorded by [AR] Mary Antony, PT 03/04/19 1300      Row Name 03/04/19 0826             Sit-Stand Transfer    Sit-Stand [place] (Transfers)  minimum assist (75% patient effort)  -AR      Assistive Device (Sit-Stand Transfers)  walker, front-wheeled  -AR      Recorded by [AR] Mary Antony, PT 03/04/19 1300      Row Name 03/04/19 0826             Stand-Sit Transfer    Stand-Sit [place] (Transfers)  contact guard  -AR      Assistive Device (Stand-Sit Transfers)  walker, front-wheeled  -AR      Recorded by [AR] Mary Antony, PT 03/04/19 1300      Row Name 03/04/19 0826             Gait/Stairs Assessment/Training    Gait/Stairs Assessment/Training  gait pattern;distance ambulated  -AR      [place] Level (Gait)  minimum assist (75% patient effort)  -AR      Assistive Device (Gait)  walker, front-wheeled  -AR      Distance in Feet (Gait)  120  -AR      Pattern (Gait)  swing-through  -AR      Deviations/Abnormal Patterns (Gait)  gait speed decreased;festinating/shuffling  -AR      Bilateral Gait Deviations  heel strike decreased;forward flexed posture  -AR      Comment (Gait/Stairs)  limited by fatigue  -AR      Recorded by [AR] Mary Antony, PT 03/04/19 1300      Row Name 03/04/19 0826             Positioning and Restraints    Pre-Treatment Position  in bed  -AR      Post Treatment Position  chair  -AR      In Chair  sitting;call light within reach;encouraged to call for assist;exit alarm on;notified nsg  -AR      Recorded by [AR] Mary Antony, PT 03/04/19 1300      Row Name 03/04/19 0826             Pain Assessment    Additional Documentation  Pain Scale: Word Pre/Post-Treatment (Group)  -AR      Recorded by [AR] Mary Antony, PT 03/04/19 1300       Row Name 03/04/19 0826             Pain Scale: Word Pre/Post-Treatment    Pain: Word Scale, Pretreatment  0 - no pain  -AR      Pain: Word Scale, Post-Treatment  0 - no pain  -AR      Recorded by [AR] Mary Antony, PT 03/04/19 1300      Row Name 03/04/19 0826             Outcome Summary/Treatment Plan (PT)    Anticipated Discharge Disposition (PT)  home with home health;home with assist;skilled nursing facility pending progress  -AR      Recorded by [AR] Mary Antony, PT 03/04/19 1300        User Key  (r) = Recorded By, (t) = Taken By, (c) = Cosigned By    Initials Name Effective Dates Discipline    AR Mary Antony, PT 04/03/18 -  PT                   Physical Therapy Education     Title: PT OT SLP Therapies (In Progress)     Topic: Physical Therapy (In Progress)     Point: Mobility training (In Progress)     Learning Progress Summary           Patient Acceptance, E, NR by AR at 3/4/2019  1:01 PM                   Point: Home exercise program (In Progress)     Learning Progress Summary           Patient Acceptance, E, NR by AR at 3/4/2019  1:01 PM                               User Key     Initials Effective Dates Name Provider Type Discipline    AR 04/03/18 -  Mary Antony, PT Physical Therapist PT                PT Recommendation and Plan  Anticipated Discharge Disposition (PT): home with home health, home with assist, skilled nursing facility(pending progress)  Therapy Frequency (PT Clinical Impression): daily  Outcome Summary/Treatment Plan (PT)  Anticipated Discharge Disposition (PT): home with home health, home with assist, skilled nursing facility(pending progress)  Plan of Care Reviewed With: patient  Outcome Summary: Slow progress towards goals during PT. Able to ambulatein cervantes w/ min A using RW.  Limited by fatigue today.    Outcome Measures     Row Name 03/04/19 1300 03/02/19 1500 03/02/19 1300       How much help from another person do you currently need...    Turning from your back  to your side while in flat bed without using bedrails?  4  -AR  --  4  -SV    Moving from lying on back to sitting on the side of a flat bed without bedrails?  4  -AR  --  3  -SV    Moving to and from a bed to a chair (including a wheelchair)?  3  -AR  --  3  -SV    Standing up from a chair using your arms (e.g., wheelchair, bedside chair)?  3  -AR  --  3  -SV    Climbing 3-5 steps with a railing?  3  -AR  --  3  -SV    To walk in hospital room?  3  -AR  --  3  -SV    AM-PAC 6 Clicks Score  20  -AR  --  19  -SV       How much help from another is currently needed...    Putting on and taking off regular lower body clothing?  --  3  -RP  --    Bathing (including washing, rinsing, and drying)  --  3  -RP  --    Toileting (which includes using toilet bed pan or urinal)  --  3  -RP  --    Putting on and taking off regular upper body clothing  --  3  -RP  --    Taking care of personal grooming (such as brushing teeth)  --  3  -RP  --    Eating meals  --  3  -RP  --    Score  --  18  -RP  --       Functional Assessment    Outcome Measure Options  --  AM-PAC 6 Clicks Daily Activity (OT)  -RP  --      User Key  (r) = Recorded By, (t) = Taken By, (c) = Cosigned By    Initials Name Provider Type    Mary Hoover, PT Physical Therapist    Jyotsna Page, PT Physical Therapist    Nata Wilkinson, OT Occupational Therapist         Time Calculation:   PT Charges     Row Name 03/04/19 1301             Time Calculation    Start Time  0826  -AR      Stop Time  0841  -AR      Time Calculation (min)  15 min  -AR      PT Received On  03/04/19  -AR      PT - Next Appointment  03/05/19  -AR        User Key  (r) = Recorded By, (t) = Taken By, (c) = Cosigned By    Initials Name Provider Type    Mary Hoover PT Physical Therapist        Therapy Suggested Charges     Code   Minutes Charges    None           Therapy Charges for Today     Code Description Service Date Service Provider Modifiers Qty    [id number]  PT  THER PROC EA 15 MIN 3/4/2019 Mary Antony, PT GP 1          PT G-Codes  Outcome Measure Options: AM-PAC 6 Clicks Daily Activity (OT)  AM-PAC 6 Clicks Score: 20  Score: 18    Mary Antony, PT  3/4/2019

## 2019-03-04 NOTE — PROGRESS NOTES
"Daily progress note    Chief complaint  Doing better  No new complaints  Wants to go home    History of present illness  89-year-old white female with history of dementia chronic atrial fibrillation coronary artery disease valvular heart disease compression fracture hypertension hyperlipidemia TIA and stroke in the past who lives at home with caregiver and family support brought to the emergency room with dry cough shortness of breath for last 1 week.  Patient denies any fever but complained of chills also denies chest pain increased shortness of breath abdominal pain nausea vomiting diarrhea.  Patient evaluated in ER found to have bilateral pneumonia admitted for management.  She was recently diagnosed with influenza A and getting treatment for that.     REVIEW OF SYSTEMS  Review of Systems   Unable to perform ROS: Dementia      PHYSICAL EXAM  Blood pressure 120/97, pulse 120, temperature 97.3 °F (36.3 °C), temperature source Oral, resp. rate 14, height 152.4 cm (60\"), weight 38.6 kg (85 lb), SpO2 96 %.    Constitutional: She is well-developed, well-nourished, and in no distress.   Pt is frail appearing and pleasantly confused.    Head: Normocephalic.   Mouth/Throat: Mucous membranes are normal.   Eyes: No scleral icterus.   Neck: Normal range of motion.   Cardiovascular: Normal rate, regular rhythm and normal heart sounds.   Pulmonary/Chest: Effort normal. She has wheezes. She has rhonchi.   Abdominal: Soft. Bowel sounds are normal. There is no tenderness.   Musculoskeletal: Normal range of motion. She exhibits no edema ( to lower extremities).   Neurological: She is alert.   Skin: Skin is warm and dry.   Psychiatric: Mood and affect normal.     LAB RESULTS  Lab Results (last 24 hours)     Procedure Component Value Units Date/Time    Blood Culture - Blood, Arm, Right [[id number]] Collected:  02/27/19 1037    Specimen:  Blood from Arm, Right Updated:  03/04/19 1100     Blood Culture No growth at 5 days    Blood " Culture - Blood, Arm, Left [[id number]] Collected:  02/27/19 1037    Specimen:  Blood from Arm, Left Updated:  03/04/19 1100     Blood Culture No growth at 5 days        Imaging Results (last 24 hours)     ** No results found for the last 24 hours. **          Current Facility-Administered Medications:   •  apixaban (ELIQUIS) tablet 2.5 mg, 2.5 mg, Oral, Q12H, Sumit Ramesh MD, 2.5 mg at 03/04/19 0356  •  aspirin chewable tablet 81 mg, 81 mg, Oral, Daily, Sumit Ramesh MD, 81 mg at 03/04/19 0941  •  atorvastatin (LIPITOR) tablet 10 mg, 10 mg, Oral, Daily, Sumit Ramesh MD, 10 mg at 03/04/19 0941  •  guaiFENesin (MUCINEX) 12 hr tablet 600 mg, 600 mg, Oral, Q12H, Sumit Ramesh MD, 600 mg at 03/04/19 0356  •  ipratropium-albuterol (DUO-NEB) nebulizer solution 3 mL, 3 mL, Nebulization, Q4H PRN, Sumit Ramesh MD  •  LORazepam (ATIVAN) injection 0.5 mg, 0.5 mg, Intravenous, Q4H PRN, Sumit Ramesh MD, 0.5 mg at 03/03/19 1311  •  metoprolol succinate XL (TOPROL-XL) 24 hr tablet 25 mg, 25 mg, Oral, Daily, Sumit Ramesh MD, 25 mg at 03/04/19 0941  •  pantoprazole (PROTONIX) EC tablet 40 mg, 40 mg, Oral, Daily, Sumit Ramesh MD, 40 mg at 03/04/19 0941  •  sennosides-docusate sodium (SENOKOT-S) 8.6-50 MG tablet 1 tablet, 1 tablet, Oral, Daily, Sumit Ramesh MD, 1 tablet at 03/04/19 0941  •  [COMPLETED] Insert peripheral IV, , , Once **AND** sodium chloride 0.9 % flush 10 mL, 10 mL, Intravenous, PRN, Marianela Draper APRN, 10 mL at 02/28/19 0830  •  tiZANidine (ZANAFLEX) tablet 2 mg, 2 mg, Oral, Nightly PRN, Sumit Ramesh MD     ASSESSMENT  AV block 3.1-second asymptomatic per cardiology  Bibasilar pneumonia community-acquired infectious  Recent influenza A  Dehydration resolved  Coronary artery disease  Chronic atrial fibrillation on Eliquis  Hypertension  Hyperlipidemia  Large hiatal hernia  Dementia  Gastroesophageal reflux disease    PLAN  Discharge home if okay with cardiology  Discharge summary dictated    SUMIT  JOSÉ ANTONIO JONES

## 2019-03-04 NOTE — DISCHARGE SUMMARY
Discharge summary    Date of admission 2/27/2019  Date of discharge 3/4/2019    Final diagnosis  AV block 3.1-second asymptomatic per cardiology  Bibasilar pneumonia  Recent influenza A  Dehydration resolved  Coronary artery disease  Chronic atrial fibrillation on Eliquis  Hypertension  Hyperlipidemia  Large hiatal hernia  Dementia  Gastroesophageal reflux disease    Discharge medications    Current Facility-Administered Medications:   •  apixaban (ELIQUIS) tablet 2.5 mg, 2.5 mg, Oral, Q12H, Huseyin Ramesh MD, 2.5 mg at 03/04/19 0356  •  aspirin chewable tablet 81 mg, 81 mg, Oral, Daily, Huseyin Ramesh MD, 81 mg at 03/04/19 0941  •  atorvastatin (LIPITOR) tablet 10 mg, 10 mg, Oral, Daily, Huseyin Ramesh MD, 10 mg at 03/04/19 0941  •  guaiFENesin (MUCINEX) 12 hr tablet 600 mg, 600 mg, Oral, Q12H, Huseyin Ramesh MD, 600 mg at 03/04/19 0356  •  metoprolol succinate XL (TOPROL-XL) 24 hr tablet 25 mg, 25 mg, Oral, Daily, Huseyin Ramesh MD, 25 mg at 03/04/19 0941  •  pantoprazole (PROTONIX) EC tablet 40 mg, 40 mg, Oral, Daily, Huseyin Ramesh MD, 40 mg at 03/04/19 0941  •  sennosides-docusate sodium (SENOKOT-S) 8.6-50 MG tablet 1 tablet, 1 tablet, Oral, Daily, Huseyin Ramesh MD, 1 tablet at 03/04/19 0941  •  [COMPLETED] Insert peripheral IV, , , Once **AND** sodium chloride 0.9 % flush 10 mL, 10 mL, Intravenous, PRN, Marianela Draper, APRN, 10 mL at 02/28/19 0830     Consult obtain  Cardiology  Nutrition    Procedures  None    Hospital course  89-year-old white female with history of dementia chronic atrial fibrillation coronary artery disease malnutrition admitted to emergency room with shortness of breath dry cough.  Patient evaluated in ER found to have bilateral pneumonia also have recent influenza A with dehydration admit for management.  Patient admitted treated for both and had very slow improvement.  Patient followed by nutrition as she has no appetite and does not want any feeding tube.  Patient remained fully  alert oriented.  Patient symptoms improved but during hospitalization she has 3.1-second AV block which is further evaluated by cardiology and recommend no further workup.  Patient has had 2D echo which showed good ejection fraction.  Patient was on digoxin which is completely stop and her beta-blocker dose adjusted.  Patient cultures remains negative and she will be discharged back home with caregiver on oral antibiotics for 5 more days.  Patient is afebrile vital signs stable and heart rate is .  Patient totally asymptomatic at the time of 3.1 pause.  Patient blood pressure stable.  Patient remained DNR throughout hospital course.    Discharge diet regular    Activity as tolerated    Medication as above    Follow-up with primary doctor in 1 week and follow-up with cardiology per their instruction and take medication as directed    SUMIT CHOU MD    .

## 2022-12-07 NOTE — NURSING NOTE
Call placed to Dr Francisco re: urine odor and lack of function for purewick. New orders notes  Call attemptes made to reach Dr William but goes to Dr Jolley call service with no answer. Will continue to attempt to reach him. Patient resting peacefully at this time   well approximated

## (undated) DEVICE — CEMENT MIXING SYSTEM WITH FEMORAL BREAKWAY NOZZLE: Brand: REVOLUTION

## (undated) DEVICE — SYR LUERLOK 50ML

## (undated) DEVICE — SOL ISO/ALC RUB 70PCT 4OZ

## (undated) DEVICE — APPL CHLORAPREP W/TINT 26ML ORNG

## (undated) DEVICE — RECIPROCATING BLADE HEAVY DUTY LONG, OFFSET  (77.6 X 0.77 X 11.2MM)

## (undated) DEVICE — ADHS SKIN DERMABOND TOP ADVANCED

## (undated) DEVICE — TOTAL TRAY, 16FR 10ML SIL FOLEY, URN: Brand: MEDLINE

## (undated) DEVICE — DRSNG TELFA PAD NONADH STR 1S 3X8IN

## (undated) DEVICE — GLV SURG SENSICARE W/ALOE PF LF 8 STRL

## (undated) DEVICE — ANTIBACTERIAL UNDYED BRAIDED (POLYGLACTIN 910), SYNTHETIC ABSORBABLE SUTURE: Brand: COATED VICRYL

## (undated) DEVICE — SUT VIC 0 CT1 36IN J946H

## (undated) DEVICE — DRSNG SURESITE WNDW 4X4.5

## (undated) DEVICE — COAXIAL FEMORAL CANAL TIP

## (undated) DEVICE — PK ANT HIP 40

## (undated) DEVICE — SUT MNCRYL 3/0 PS2 18IN MCP497G

## (undated) DEVICE — ADHS SKIN DERMABOND

## (undated) DEVICE — PREP SOL POVIDONE/IODINE BT 4OZ

## (undated) DEVICE — MEDICINE CUP, GRADUATED, STER: Brand: MEDLINE

## (undated) DEVICE — 3M™ IOBAN™ 2 ANTIMICROBIAL INCISE DRAPE 6650EZ: Brand: IOBAN™ 2

## (undated) DEVICE — DRAPE,U/ SHT,SPLIT,PLAS,STERIL: Brand: MEDLINE

## (undated) DEVICE — DRSNG TELFA ILND ADH 4X6IN

## (undated) DEVICE — MAT FLR ABSORBENT LG 4FT 10 2.5FT

## (undated) DEVICE — 6617 IOBAN II PATIENT ISOLATION DRAPE 5/BX,4BX/CS: Brand: STERI-DRAPE™ IOBAN™ 2

## (undated) DEVICE — CONTAINER,SPECIMEN,OR STERILE,4OZ: Brand: MEDLINE

## (undated) DEVICE — GLV SURG BIOGEL LTX PF 8 1/2

## (undated) DEVICE — TRY SKINPREP DRYPREP

## (undated) DEVICE — GLV SURG BIOGEL LTX PF 8

## (undated) DEVICE — PREP IM ENCHANCED TOTAL HIP BONE                                    PREPARATION KIT: Brand: PREP-IM